# Patient Record
Sex: MALE | Race: WHITE | NOT HISPANIC OR LATINO | Employment: FULL TIME | URBAN - METROPOLITAN AREA
[De-identification: names, ages, dates, MRNs, and addresses within clinical notes are randomized per-mention and may not be internally consistent; named-entity substitution may affect disease eponyms.]

---

## 2017-08-11 ENCOUNTER — OFFICE VISIT (OUTPATIENT)
Dept: URGENT CARE | Age: 22
End: 2017-08-11
Payer: COMMERCIAL

## 2017-08-11 PROCEDURE — S9088 SERVICES PROVIDED IN URGENT: HCPCS | Performed by: FAMILY MEDICINE

## 2017-08-11 PROCEDURE — 99203 OFFICE O/P NEW LOW 30 MIN: CPT | Performed by: FAMILY MEDICINE

## 2018-03-25 ENCOUNTER — OFFICE VISIT (OUTPATIENT)
Dept: URGENT CARE | Facility: MEDICAL CENTER | Age: 23
End: 2018-03-25
Payer: COMMERCIAL

## 2018-03-25 VITALS
BODY MASS INDEX: 40.43 KG/M2 | HEART RATE: 82 BPM | WEIGHT: 315 LBS | TEMPERATURE: 98.2 F | DIASTOLIC BLOOD PRESSURE: 80 MMHG | SYSTOLIC BLOOD PRESSURE: 132 MMHG | HEIGHT: 74 IN | RESPIRATION RATE: 20 BRPM | OXYGEN SATURATION: 100 %

## 2018-03-25 DIAGNOSIS — M94.0 ACUTE COSTOCHONDRITIS: Primary | ICD-10-CM

## 2018-03-25 PROCEDURE — 99212 OFFICE O/P EST SF 10 MIN: CPT | Performed by: PHYSICIAN ASSISTANT

## 2018-03-25 PROCEDURE — S9088 SERVICES PROVIDED IN URGENT: HCPCS | Performed by: PHYSICIAN ASSISTANT

## 2018-03-25 RX ORDER — MELOXICAM 15 MG/1
15 TABLET ORAL DAILY
Qty: 14 TABLET | Refills: 0 | Status: SHIPPED | OUTPATIENT
Start: 2018-03-25 | End: 2018-09-19 | Stop reason: ALTCHOICE

## 2018-03-25 RX ORDER — RANITIDINE 300 MG/1
300 TABLET ORAL
Qty: 14 TABLET | Refills: 0 | Status: SHIPPED | OUTPATIENT
Start: 2018-03-25 | End: 2018-09-19 | Stop reason: ALTCHOICE

## 2018-03-25 RX ORDER — ESOMEPRAZOLE MAGNESIUM 40 MG/1
CAPSULE, DELAYED RELEASE ORAL 2 TIMES DAILY
COMMUNITY
End: 2019-03-05 | Stop reason: ALTCHOICE

## 2018-03-25 NOTE — PROGRESS NOTES
[facility]        NAME: Elizabeth Kelly is a 23 y o  male  : 1995    MRN: [id number]  DATE: 2018  TIME: 9:37 AM    Assessment and Plan   Acute costochondritis [M94 0]  1  Acute costochondritis  meloxicam (MOBIC) 15 mg tablet    ranitidine (ZANTAC) 300 MG tablet         Patient Instructions       Follow up with PCP in 7-14 days as Needed  Deep breaths to prevent pneumonia  Chief Complaint     Chief Complaint   Patient presents with    Shortness of Breath     Pt with complaints of chest pain , shortness of breath this am  States pain worse with inspiration and movement  Denies any trauma or illness   Chest Pain         History of Present Illness       Chest Pain    This is a new problem  The current episode started today  The onset quality is gradual  The problem occurs constantly  The problem has been unchanged  The pain is present in the substernal region  The pain is at a severity of 4/10  The quality of the pain is described as stabbing and tightness  The pain does not radiate  Pertinent negatives include no abdominal pain, back pain, claudication, cough, diaphoresis, dizziness, exertional chest pressure, fever, headaches, hemoptysis, irregular heartbeat, leg pain, lower extremity edema, malaise/fatigue, nausea, near-syncope, numbness, orthopnea, palpitations, PND, shortness of breath, sputum production, syncope, vomiting or weakness  Patient does do some lifting and this morning awoke with mid sternal chest discomfort bilaterally with the right being greater than the left  Pain is reproducible by touching the area next to his sternum  No trauma  He feels that he has some shortness of breath, but when it is described it is that he catches his breath after trying to take a deep breath due to the pain  No cough  No previous problems  No history of heart disease  He does have GERD  It is poorly controlled though he states this is different than his usual GERD symptoms    Review of Systems   Review of Systems   Constitutional: Negative for diaphoresis, fever and malaise/fatigue  Respiratory: Negative for cough, hemoptysis, sputum production and shortness of breath  Cardiovascular: Positive for chest pain  Negative for palpitations, orthopnea, claudication, syncope, PND and near-syncope  Gastrointestinal: Negative for abdominal pain, nausea and vomiting  Musculoskeletal: Negative for back pain  Neurological: Negative for dizziness, weakness, numbness and headaches  All other systems reviewed and are negative  Current Medications       Current Outpatient Prescriptions:     esomeprazole (NEXIUM) 40 MG capsule, Take by mouth, Disp: , Rfl:     meloxicam (MOBIC) 15 mg tablet, Take 1 tablet (15 mg total) by mouth daily, Disp: 14 tablet, Rfl: 0    ranitidine (ZANTAC) 300 MG tablet, Take 1 tablet (300 mg total) by mouth daily at bedtime, Disp: 14 tablet, Rfl: 0    Current Allergies     Allergies as of 03/25/2018 - Reviewed 03/25/2018   Allergen Reaction Noted    Adderall [amphetamine-dextroamphetamine] Hallucinations 03/25/2018    Other  08/11/2017            The following portions of the patient's history were reviewed and updated as appropriate: allergies, current medications, past family history, past medical history, past social history, past surgical history and problem list      Past Medical History:   Diagnosis Date    GERD (gastroesophageal reflux disease)        No past surgical history on file  No family history on file  Medications have been verified  Objective   /80 (BP Location: Right arm, Patient Position: Sitting, Cuff Size: Large)   Pulse 82   Temp 98 2 °F (36 8 °C) (Tympanic)   Resp 20   Ht 6' 2" (1 88 m)   Wt (!) 144 kg (317 lb)   SpO2 100%   BMI 40 70 kg/m²        Physical Exam     Physical Exam   Constitutional: He appears well-developed and well-nourished  Cardiovascular: Normal rate, regular rhythm and normal heart sounds  Pulmonary/Chest: Effort normal and breath sounds normal    Abdominal: Soft  Bowel sounds are normal      Obese male in no acute distress  Reproducible Tenderness to palpation between the fourth and seventh rib, right greater than left in the areas attached toThe sternum

## 2018-03-25 NOTE — PATIENT INSTRUCTIONS
Costochondritis   WHAT YOU NEED TO KNOW:   Costochondritis is a condition that causes pain in the cartilage that connect your ribs to your sternum (breastbone)  Cartilage is the tough, bendable tissue that protects your bones  DISCHARGE INSTRUCTIONS:   Medicines:   · Acetaminophen: This medicine decreases pain  Acetaminophen is available without a doctor's order  Ask how much to take and how often to take it  Follow directions  Acetaminophen can cause liver damage if not taken correctly  · NSAIDs , such as ibuprofen, help decrease swelling, pain, and fever  This medicine is available with or without a doctor's order  NSAIDs can cause stomach bleeding or kidney problems in certain people  If you take blood thinner medicine, always ask if NSAIDs are safe for you  Always read the medicine label and follow directions  Do not give these medicines to children under 10months of age without direction from your child's healthcare provider  · Take your medicine as directed  Contact your healthcare provider if you think your medicine is not helping or if you have side effects  Tell him of her if you are allergic to any medicine  Keep a list of the medicines, vitamins, and herbs you take  Include the amounts, and when and why you take them  Bring the list or the pill bottles to follow-up visits  Carry your medicine list with you in case of an emergency  Follow up with your healthcare provider as directed:  Write down your questions so you remember to ask them during your visits  Rest:  You may need to get more rest  Learn which movements and activities cause pain, and avoid doing them  Do not carry objects, such as a purse or backpack, if this is painful  Avoid activities such as rowing and weightlifting until your pain decreases or goes away  Ask which activities are best for you to do while you recover  Heat:  Heat helps decrease pain in some patients   Apply heat on the area for 20 to 30 minutes every 2 hours for as many days as directed  Ice:  Ice helps decrease swelling and pain  Ice may also help prevent tissue damage  Use an ice pack, or put crushed ice in a plastic bag  Cover it with a towel and place it on the painful area for 15 to 20 minutes every hour or as directed  Stretching exercises:  Gentle stretching may help your symptoms   a doorway and put your hands on the door frame at the level of your ears or shoulders  Take 1 step forward and gently stretch your chest  Try this with your hands higher up on the doorway  Contact your healthcare provider if:   · You have a fever  · The painful areas of your chest look swollen, red, and feel warm to the touch  · You cannot sleep because of the pain  · You have questions or concerns about your condition or care  © 2017 [employer]  Information is for End User's use only and may not be sold, redistributed or otherwise used for commercial purposes  All illustrations and images included in CareNotes® are the copyrighted property of A D A M , Inc  or [employer]  The above information is an  only  It is not intended as medical advice for individual conditions or treatments  Talk to your doctor, nurse or pharmacist before following any medical regimen to see if it is safe and effective for you

## 2018-03-25 NOTE — LETTER
March 25, 2018     Patient: Mikey Hair   YOB: 1995   Date of Visit: 3/25/2018       To Whom It May Concern: It is my medical opinion that Mikey Hair should remain out of work until 3/27/18  If you have any questions or concerns, please don't hesitate to call           Sincerely,        Mila Dickey PA-C    CC: No Recipients

## 2018-09-19 ENCOUNTER — HOSPITAL ENCOUNTER (EMERGENCY)
Facility: HOSPITAL | Age: 23
Discharge: HOME | End: 2018-09-19
Attending: EMERGENCY MEDICINE | Admitting: EMERGENCY MEDICINE
Payer: COMMERCIAL

## 2018-09-19 ENCOUNTER — APPOINTMENT (EMERGENCY)
Dept: RADIOLOGY | Facility: HOSPITAL | Age: 23
End: 2018-09-19
Payer: COMMERCIAL

## 2018-09-19 VITALS
DIASTOLIC BLOOD PRESSURE: 64 MMHG | SYSTOLIC BLOOD PRESSURE: 138 MMHG | OXYGEN SATURATION: 99 % | BODY MASS INDEX: 40.76 KG/M2 | HEART RATE: 71 BPM | RESPIRATION RATE: 16 BRPM | WEIGHT: 315 LBS | TEMPERATURE: 98.3 F

## 2018-09-19 DIAGNOSIS — R07.89 CHEST WALL PAIN: Primary | ICD-10-CM

## 2018-09-19 PROCEDURE — 99285 EMERGENCY DEPT VISIT HI MDM: CPT

## 2018-09-19 PROCEDURE — 71046 X-RAY EXAM CHEST 2 VIEWS: CPT

## 2018-09-19 PROCEDURE — 93005 ELECTROCARDIOGRAM TRACING: CPT

## 2018-09-19 RX ORDER — NAPROXEN 500 MG/1
500 TABLET ORAL EVERY 12 HOURS PRN
Qty: 15 TABLET | Refills: 0 | Status: SHIPPED | OUTPATIENT
Start: 2018-09-19 | End: 2019-01-03

## 2018-09-19 NOTE — ED PROVIDER NOTES
History  Chief Complaint   Patient presents with    Chest Pain     L sided chest pain and SOB  Started yesterday  Went away and then came back today  18-year-old male with no past medical history presents to the emergency department with episodes of sharp left-sided chest pain  He had 1 episode yesterday lasting a few minutes  Today he states he was at work and doing a rush of customers when he had sharp left-sided chest pain and he felt like his left arm was going numb  No diaphoresis or shortness of breath  This episode lasted about 10 minutes  He now feels back to his baseline  He states recently he has been trying to get back in shape and walking every day  During his walking episodes he does not experience chest pain  Chest Pain   Pain location:  L chest  Pain quality: sharp    Pain radiates to:  Does not radiate  Onset quality:  Gradual  Duration:  10 minutes  Timing:  Intermittent  Progression:  Unchanged  Chronicity:  New  Context: stress    Relieved by:  None tried  Worsened by:  Deep breathing  Ineffective treatments:  None tried  Associated symptoms: no abdominal pain, no altered mental status, no anorexia, no anxiety, no back pain, no claudication, no cough, no diaphoresis, no dizziness, no fatigue, no fever, no headache, no heartburn, no lower extremity edema, no nausea, no near-syncope, no numbness, no orthopnea, no palpitations, no PND, no shortness of breath, no syncope, not vomiting and no weakness    Risk factors: obesity    Risk factors: no coronary artery disease, no diabetes mellitus, no high cholesterol, no hypertension, no immobilization, no prior DVT/PE, no smoking and no surgery        Prior to Admission Medications   Prescriptions Last Dose Informant Patient Reported?  Taking?   esomeprazole ([employer]) 40 MG capsule 9/18/2018 at Unknown time  Yes Yes   Sig: Take by mouth      Facility-Administered Medications: None       Past Medical History:   Diagnosis Date    GERD (gastroesophageal reflux disease)        Past Surgical History:   Procedure Laterality Date    ADENOIDECTOMY      GASTROINTESTINAL SURGERY      Upper    MYRINGOTOMY W/ TUBES         Family History   Problem Relation Age of Onset    Coronary artery disease Maternal Grandfather     Diabetes type II Maternal Grandfather         Mellitus    Hypertension Maternal Grandfather     Cancer Paternal Grandmother         Unknown    Heart attack Paternal Grandfather         Myocardial Infarction    Diabetes type II Family         Mellitus    Hypertension Family      I have reviewed and agree with the history as documented  Social History   Substance Use Topics    Smoking status: Never Smoker    Smokeless tobacco: Never Used      Comment: No passive smoke exposure    Alcohol use Yes      Comment: socially        Review of Systems   Constitutional: Negative  Negative for diaphoresis, fatigue and fever  HENT: Negative  Eyes: Negative  Respiratory: Negative  Negative for cough and shortness of breath  Cardiovascular: Positive for chest pain  Negative for palpitations, orthopnea, claudication, leg swelling, syncope, PND and near-syncope  Gastrointestinal: Negative  Negative for abdominal pain, anorexia, heartburn, nausea and vomiting  Genitourinary: Negative  Musculoskeletal: Negative for back pain and neck pain  Skin: Negative  Allergic/Immunologic: Negative  Neurological: Negative  Negative for dizziness, weakness, numbness and headaches  Hematological: Negative  Psychiatric/Behavioral: Negative  All other systems reviewed and are negative  Physical Exam  Physical Exam   Constitutional: He is oriented to person, place, and time  He appears well-developed and well-nourished  Non-toxic appearance  He does not have a sickly appearance  He does not appear ill  No distress  HENT:   Head: Normocephalic and atraumatic     Right Ear: External ear normal    Left Ear: External ear normal    Mouth/Throat: Oropharynx is clear and moist    Eyes: Conjunctivae are normal  Pupils are equal, round, and reactive to light  No scleral icterus  Cardiovascular: Normal rate, regular rhythm and normal heart sounds  Pulmonary/Chest: Effort normal and breath sounds normal    Abdominal: Soft  Bowel sounds are normal  He exhibits no distension and no mass  There is no tenderness  No hernia  obese   Musculoskeletal: Normal range of motion  He exhibits no edema, tenderness or deformity  Neurological: He is alert and oriented to person, place, and time  He has normal strength and normal reflexes  He exhibits normal muscle tone  Skin: Skin is warm and dry  No rash noted  He is not diaphoretic  No erythema  No pallor  Psychiatric: He has a normal mood and affect  Nursing note and vitals reviewed  Vital Signs  ED Triage Vitals [09/19/18 1839]   Temperature Pulse Respirations Blood Pressure SpO2   98 3 °F (36 8 °C) 70 16 155/70 100 %      Temp Source Heart Rate Source Patient Position - Orthostatic VS BP Location FiO2 (%)   Oral Monitor Sitting Left arm --      Pain Score       1           Vitals:    09/19/18 1839 09/19/18 1845   BP: 155/70 155/70   Pulse: 70 76   Patient Position - Orthostatic VS: Sitting Sitting       Visual Acuity      ED Medications  Medications - No data to display    Diagnostic Studies  Results Reviewed     None                 XR chest 2 views   ED Interpretation by Caty Key DO (09/19 1938)   nad                 Procedures  Procedures       Phone Contacts  ED Phone Contact    ED Course                               MDM  Number of Diagnoses or Management Options  Diagnosis management comments: 17-year-old male presents with episodes of sharp left chest pain since yesterday  These episodes are not associated with exertion  It last about 10 minutes  On exam he appears well and is currently asymptomatic  His exam here is normal along with his EKG    Only risk factor is that of obesity, however his history is not concerning for acute coronary syndrome  This may be related to anxiety versus chest wall pain  Will do chest x-ray to rule out underlying pneumonia/pneumothorax  Amount and/or Complexity of Data Reviewed  Tests in the radiology section of CPT®: ordered and reviewed      CritCare Time    Disposition  Final diagnoses:   Chest wall pain     Time reflects when diagnosis was documented in both MDM as applicable and the Disposition within this note     Time User Action Codes Description Comment    9/19/2018  7:39 PM Abdulaziz Murdockheladio NANCY Add [R07 89] Chest wall pain       ED Disposition     ED Disposition Condition Comment    Discharge  Heliojanet Izquierdo discharge to home/self care  Condition at discharge: Good        Follow-up Information     Follow up With Specialties Details Why Contact Info Additional Information    [facility] [address] Lab Schedule an appointment as soon as possible for a visit in 2 days If symptoms worsen [place] [id number] [facility] [address] [facility], [address] [place] [address] , [place], [address] [place] [address], [phone]          Patient's Medications   Discharge Prescriptions    NAPROXEN (NAPROSYN) 500 MG TABLET    Take 1 tablet (500 mg total) by mouth every 12 (twelve) hours as needed for mild pain or moderate pain       Start Date: 9/19/2018 End Date: --       Order Dose: 500 mg       Quantity: 15 tablet    Refills: 0     No discharge procedures on file      ED Provider  Electronically Signed by           Jim Blackman DO  09/19/18 6235

## 2018-09-19 NOTE — DISCHARGE INSTRUCTIONS
Chest Wall Pain   WHAT YOU NEED TO KNOW:   Chest wall pain may be caused by problems with the muscles, cartilage, or bones of the chest wall  Chest wall pain may also be caused by pain that spreads to your chest from another part of your body  The pain may be aching, severe, dull, or sharp  It may come and go, or it may be constant  The pain may be worse when you move in certain ways, breathe deeply, or cough  DISCHARGE INSTRUCTIONS:   Call 911 if:   · You have any of the following signs of a heart attack:      ¨ Squeezing, pressure, or pain in your chest that lasts longer than 5 minutes or returns    ¨ Discomfort or pain in your back, neck, jaw, stomach, or arm     ¨ Trouble breathing    ¨ Nausea or vomiting    ¨ Lightheadedness or a sudden cold sweat, especially with chest pain or trouble breathing    Return to the emergency department if:   · You have severe pain  Contact your healthcare provider if:   · You develop a rash  · You have other new symptoms  · Your pain does not improve, even with treatment  · You have questions or concerns about your condition or care  Medicines: You may need any of the following:  · NSAIDs , such as ibuprofen, help decrease swelling, pain, and fever  This medicine is available with or without a doctor's order  NSAIDs can cause stomach bleeding or kidney problems in certain people  If you take blood thinner medicine, always ask your healthcare provider if NSAIDs are safe for you  Always read the medicine label and follow directions  · Acetaminophen  decreases pain  It is available without a doctor's order  Ask how much to take and how often to take it  Follow directions  Acetaminophen can cause liver damage if not taken correctly  · A cream  may be applied to your chest to decrease pain  · Take your medicine as directed  Contact your healthcare provider if you think your medicine is not helping or if you have side effects   Tell him of her if you are allergic to any medicine  Keep a list of the medicines, vitamins, and herbs you take  Include the amounts, and when and why you take them  Bring the list or the pill bottles to follow-up visits  Carry your medicine list with you in case of an emergency  Follow up with your healthcare provider as directed:  Write down your questions so you remember to ask them during your visits  Self-care:   · Rest  as needed  Avoid activities that make your chest wall pain worse  · Apply heat  on your chest for 20 to 30 minutes every 2 hours for as many days as directed  Heat helps decrease pain and muscle spasms  · Apply ice  on your chest for 15 to 20 minutes every hour or as directed  Use an ice pack, or put crushed ice in a plastic bag  Cover it with a towel  Ice helps prevent tissue damage and decreases swelling and pain  © 2017 [employer]  Information is for End User's use only and may not be sold, redistributed or otherwise used for commercial purposes  All illustrations and images included in CareNotes® are the copyrighted property of [employer] , Inc  or [employer]  The above information is an  only  It is not intended as medical advice for individual conditions or treatments  Talk to your doctor, nurse or pharmacist before following any medical regimen to see if it is safe and effective for you

## 2018-09-19 NOTE — ED NOTES
Pt reports CP that started yesterday in upper left chest   Pt states pain resolved but returned today  Pt states he thinks he may have "pulled a muscle or something "  Pt denies SOB at present         Kaity Triana RN  09/19/18 1681

## 2018-09-20 LAB
ATRIAL RATE: 70 BPM
ATRIAL RATE: 74 BPM
P AXIS: 10 DEGREES
P AXIS: 40 DEGREES
PR INTERVAL: 116 MS
PR INTERVAL: 128 MS
QRS AXIS: 48 DEGREES
QRS AXIS: 49 DEGREES
QRSD INTERVAL: 102 MS
QRSD INTERVAL: 98 MS
QT INTERVAL: 374 MS
QT INTERVAL: 374 MS
QTC INTERVAL: 403 MS
QTC INTERVAL: 415 MS
T WAVE AXIS: 51 DEGREES
T WAVE AXIS: 58 DEGREES
VENTRICULAR RATE: 70 BPM
VENTRICULAR RATE: 74 BPM

## 2018-09-20 PROCEDURE — 93010 ELECTROCARDIOGRAM REPORT: CPT | Performed by: INTERNAL MEDICINE

## 2019-01-03 ENCOUNTER — OFFICE VISIT (OUTPATIENT)
Dept: GASTROENTEROLOGY | Facility: MEDICAL CENTER | Age: 24
End: 2019-01-03
Payer: COMMERCIAL

## 2019-01-03 VITALS
WEIGHT: 315 LBS | HEART RATE: 84 BPM | DIASTOLIC BLOOD PRESSURE: 82 MMHG | HEIGHT: 74 IN | BODY MASS INDEX: 40.43 KG/M2 | TEMPERATURE: 97.1 F | SYSTOLIC BLOOD PRESSURE: 136 MMHG

## 2019-01-03 DIAGNOSIS — R10.13 DYSPEPSIA: Primary | ICD-10-CM

## 2019-01-03 PROCEDURE — 99244 OFF/OP CNSLTJ NEW/EST MOD 40: CPT | Performed by: INTERNAL MEDICINE

## 2019-01-03 RX ORDER — OMEPRAZOLE 40 MG/1
40 CAPSULE, DELAYED RELEASE ORAL DAILY
Qty: 30 CAPSULE | Refills: 1 | Status: SHIPPED | OUTPATIENT
Start: 2019-01-03 | End: 2019-02-26 | Stop reason: SDUPTHER

## 2019-01-03 NOTE — PROGRESS NOTES
[facility] - Outpatient Consultation  Valarie Powers  20 y o  male MRN: [id number]  Encounter: [id number]          ASSESSMENT AND PLAN:      1  Dyspepsia  - Trial of omeprazole to see whether it can relieve his symptoms    - Stool H  Pylori antigen test   - Plan for EGD to rule out PUD  - Abdominal U/S to rule out gallstone disease        ______________________________________________________________________    HPI:  42-year-old man with morbid obesity referred for evaluation of dyspepsia  Patient reported he feels uncomfortable after eating in epigastric area, especially after eating greasy food  Patient has been having symptoms for several years  He was initially evaluated by [facility] and he was told gastritis and a hole on the liver   Patient reported he has been gaining weight in the past few years  He initially was taking Dexilant with relief of his symptoms but his insurance does not cover it, when he switched to Nexium he reported his symptoms recurred  REVIEW OF SYSTEMS:    CONSTITUTIONAL: Denies any fever, chills, rigors, and weight loss  HEENT: No earache or tinnitus  Denies hearing loss or visual disturbances  CARDIOVASCULAR: No chest pain or palpitations  RESPIRATORY: Denies any cough, hemoptysis, shortness of breath or dyspnea on exertion  GASTROINTESTINAL: As noted in the History of Present Illness  GENITOURINARY: No problems with urination  Denies any hematuria or dysuria  NEUROLOGIC: No dizziness or vertigo, denies headaches  MUSCULOSKELETAL: Denies any muscle or joint pain  SKIN: Denies skin rashes or itching  ENDOCRINE: Denies excessive thirst  Denies intolerance to heat or cold  PSYCHOSOCIAL: Denies depression or anxiety  Denies any recent memory loss         Historical Information   Past Medical History:   Diagnosis Date    GERD (gastroesophageal reflux disease)      Past Surgical History:   Procedure Laterality Date    ADENOIDECTOMY  GASTROINTESTINAL SURGERY      Upper    MYRINGOTOMY W/ TUBES       Social History   History   Alcohol Use    Yes     Comment: socially     History   Drug Use No     History   Smoking Status    Never Smoker   Smokeless Tobacco    Never Used     Comment: No passive smoke exposure     Family History   Problem Relation Age of Onset    Coronary artery disease Maternal Grandfather     Diabetes type II Maternal Grandfather         Mellitus    Hypertension Maternal Grandfather     Cancer Paternal Grandmother         Unknown    Heart attack Paternal Grandfather         Myocardial Infarction    Diabetes type II Family         Mellitus    Hypertension Family        Meds/Allergies       Current Outpatient Prescriptions:     esomeprazole (NEXIUM) 40 MG capsule    omeprazole (PriLOSEC) 40 MG capsule    Allergies   Allergen Reactions    Adderall [Amphetamine-Dextroamphetamine] Hallucinations    Other            Objective     Blood pressure 136/82, pulse 84, temperature (!) 97 1 °F (36 2 °C), temperature source Tympanic Core, height 6' 2" (1 88 m), weight (!) 150 kg (331 lb)  Body mass index is 42 5 kg/m²  PHYSICAL EXAM:      General Appearance:   Alert, cooperative, no distress   HEENT:   Normocephalic, atraumatic, anicteric      Neck:  Supple, symmetrical, trachea midline   Lungs:   Clear to auscultation bilaterally; no rales, rhonchi or wheezing; respirations unlabored    Heart[de-identified]   Regular rate and rhythm; no murmur, rub, or gallop  Abdomen:   Soft, non-tender, non-distended; normal bowel sounds; no masses, no organomegaly    Genitalia:   Deferred    Rectal:   Deferred    Extremities:  No cyanosis, clubbing or edema    Pulses:  2+ and symmetric    Skin:  No jaundice, rashes, or lesions    Lymph nodes:  No palpable cervical lymphadenopathy        Lab Results:   No visits with results within 1 Day(s) from this visit     Latest known visit with results is:   Admission on 09/19/2018, Discharged on 09/19/2018   Component Date Value    Ventricular Rate 09/19/2018 74     Atrial Rate 09/19/2018 74     AZ Interval 09/19/2018 116     QRSD Interval 09/19/2018 102     QT Interval 09/19/2018 374     QTC Interval 09/19/2018 415     P Axis 09/19/2018 40     QRS Axis 09/19/2018 49     T Wave Axis 09/19/2018 58     Ventricular Rate 09/19/2018 70     Atrial Rate 09/19/2018 70     AZ Interval 09/19/2018 128     QRSD Interval 09/19/2018 98     QT Interval 09/19/2018 374     QTC Interval 09/19/2018 403     P Axis 09/19/2018 10     QRS Axis 09/19/2018 48     T Wave Axis 09/19/2018 51          Radiology Results:   No results found

## 2019-01-03 NOTE — LETTER
January 3, 2019     Referral [address] [place], [place] [id number]    Patient: Elizabeth Schneider  YOB: 1995   Date of Visit: 1/3/2019       Dear Dr Junior Nettles:    Thank you for referring Josie Quigley to me for evaluation  Below are my notes for this consultation  If you have questions, please do not hesitate to call me  I look forward to following your patient along with you  Sincerely,        Adilia Whitney MD        CC: No Recipients  Adilia Whitney MD  1/3/2019 12:50 PM  Sign at close encounter  [facility] - Outpatient Consultation  Elizabeth Schneider  20 y o  male MRN: [id number]  Encounter: [id number]          ASSESSMENT AND PLAN:      1  Dyspepsia  - Trial of omeprazole to see whether it can relieve his symptoms    - Stool H  Pylori antigen test   - Plan for EGD to rule out PUD  - Abdominal U/S to rule out gallstone disease        ______________________________________________________________________    HPI:  70-year-old man with morbid obesity referred for evaluation of dyspepsia  Patient reported he feels uncomfortable after eating in epigastric area, especially after eating greasy food  Patient has been having symptoms for several years  He was initially evaluated by [facility] and he was told gastritis and a hole on the liver   Patient reported he has been gaining weight in the past few years  He initially was taking Dexilant with relief of his symptoms but his insurance does not cover it, when he switched to Nexium he reported his symptoms recurred  REVIEW OF SYSTEMS:    CONSTITUTIONAL: Denies any fever, chills, rigors, and weight loss  HEENT: No earache or tinnitus  Denies hearing loss or visual disturbances  CARDIOVASCULAR: No chest pain or palpitations  RESPIRATORY: Denies any cough, hemoptysis, shortness of breath or dyspnea on exertion  GASTROINTESTINAL: As noted in the History of Present Illness     GENITOURINARY: No problems with urination  Denies any hematuria or dysuria  NEUROLOGIC: No dizziness or vertigo, denies headaches  MUSCULOSKELETAL: Denies any muscle or joint pain  SKIN: Denies skin rashes or itching  ENDOCRINE: Denies excessive thirst  Denies intolerance to heat or cold  PSYCHOSOCIAL: Denies depression or anxiety  Denies any recent memory loss  Historical Information   Past Medical History:   Diagnosis Date    GERD (gastroesophageal reflux disease)      Past Surgical History:   Procedure Laterality Date    ADENOIDECTOMY      GASTROINTESTINAL SURGERY      Upper    MYRINGOTOMY W/ TUBES       Social History   History   Alcohol Use    Yes     Comment: socially     History   Drug Use No     History   Smoking Status    Never Smoker   Smokeless Tobacco    Never Used     Comment: No passive smoke exposure     Family History   Problem Relation Age of Onset    Coronary artery disease Maternal Grandfather     Diabetes type II Maternal Grandfather         Mellitus    Hypertension Maternal Grandfather     Cancer Paternal Grandmother         Unknown    Heart attack Paternal Grandfather         Myocardial Infarction    Diabetes type II Family         Mellitus    Hypertension Family        Meds/Allergies       Current Outpatient Prescriptions:     esomeprazole (NEXIUM) 40 MG capsule    omeprazole (PriLOSEC) 40 MG capsule    Allergies   Allergen Reactions    Adderall [Amphetamine-Dextroamphetamine] Hallucinations    Other            Objective     Blood pressure 136/82, pulse 84, temperature (!) 97 1 °F (36 2 °C), temperature source Tympanic Core, height 6' 2" (1 88 m), weight (!) 150 kg (331 lb)  Body mass index is 42 5 kg/m²          PHYSICAL EXAM:      General Appearance:   Alert, cooperative, no distress   HEENT:   Normocephalic, atraumatic, anicteric      Neck:  Supple, symmetrical, trachea midline   Lungs:   Clear to auscultation bilaterally; no rales, rhonchi or wheezing; respirations unlabored    Heart[de-identified]   Regular rate and rhythm; no murmur, rub, or gallop  Abdomen:   Soft, non-tender, non-distended; normal bowel sounds; no masses, no organomegaly    Genitalia:   Deferred    Rectal:   Deferred    Extremities:  No cyanosis, clubbing or edema    Pulses:  2+ and symmetric    Skin:  No jaundice, rashes, or lesions    Lymph nodes:  No palpable cervical lymphadenopathy        Lab Results:   No visits with results within 1 Day(s) from this visit  Latest known visit with results is:   Admission on 09/19/2018, Discharged on 09/19/2018   Component Date Value    Ventricular Rate 09/19/2018 74     Atrial Rate 09/19/2018 74     SC Interval 09/19/2018 116     QRSD Interval 09/19/2018 102     QT Interval 09/19/2018 374     QTC Interval 09/19/2018 415     P Axis 09/19/2018 40     QRS Axis 09/19/2018 49     T Wave Axis 09/19/2018 58     Ventricular Rate 09/19/2018 70     Atrial Rate 09/19/2018 70     SC Interval 09/19/2018 128     QRSD Interval 09/19/2018 98     QT Interval 09/19/2018 374     QTC Interval 09/19/2018 403     P Axis 09/19/2018 10     QRS Axis 09/19/2018 48     T Wave Axis 09/19/2018 51          Radiology Results:   No results found

## 2019-01-03 NOTE — PATIENT INSTRUCTIONS
The patient is scheduled on 3/2/19 at [facility] for an EGD with Dr Nieves Reveles  Prep was gone over with by the MA  He is scheduled for his US of abd on 1/9/19 at [facility]   Pt is aware of all instructions

## 2019-01-09 ENCOUNTER — HOSPITAL ENCOUNTER (OUTPATIENT)
Dept: ULTRASOUND IMAGING | Facility: MEDICAL CENTER | Age: 24
Discharge: HOME | End: 2019-01-09
Attending: INTERNAL MEDICINE
Payer: COMMERCIAL

## 2019-01-09 DIAGNOSIS — R10.13 DYSPEPSIA: ICD-10-CM

## 2019-01-09 PROCEDURE — 76705 ECHO EXAM OF ABDOMEN: CPT

## 2019-02-26 ENCOUNTER — TELEPHONE (OUTPATIENT)
Dept: GASTROENTEROLOGY | Facility: CLINIC | Age: 24
End: 2019-02-26

## 2019-02-26 DIAGNOSIS — R10.13 DYSPEPSIA: ICD-10-CM

## 2019-02-26 RX ORDER — OMEPRAZOLE 40 MG/1
40 CAPSULE, DELAYED RELEASE ORAL DAILY
Qty: 30 CAPSULE | Refills: 4 | Status: SHIPPED | OUTPATIENT
Start: 2019-02-26 | End: 2019-02-28 | Stop reason: SDUPTHER

## 2019-02-26 NOTE — TELEPHONE ENCOUNTER
Malaika pt     Please send a refill for omeprazole 40mg to [facility] on [facility] [phone]

## 2019-02-28 DIAGNOSIS — R10.13 DYSPEPSIA: ICD-10-CM

## 2019-02-28 RX ORDER — OMEPRAZOLE 40 MG/1
40 CAPSULE, DELAYED RELEASE ORAL DAILY
Qty: 30 CAPSULE | Refills: 4 | Status: SHIPPED | OUTPATIENT
Start: 2019-02-28 | End: 2019-03-05 | Stop reason: SDUPTHER

## 2019-03-05 ENCOUNTER — TELEPHONE (OUTPATIENT)
Dept: GASTROENTEROLOGY | Facility: MEDICAL CENTER | Age: 24
End: 2019-03-05

## 2019-03-05 DIAGNOSIS — R10.13 DYSPEPSIA: ICD-10-CM

## 2019-03-05 RX ORDER — OMEPRAZOLE 40 MG/1
40 CAPSULE, DELAYED RELEASE ORAL DAILY
Qty: 30 CAPSULE | Refills: 5 | Status: SHIPPED | OUTPATIENT
Start: 2019-03-05 | End: 2019-09-24 | Stop reason: SDUPTHER

## 2019-03-05 NOTE — TELEPHONE ENCOUNTER
Dr Sarita Mera patient called for a refill of omeprazole 40mg  Patient request if this can be sent to [facility]   Patient can be reached at [phone]

## 2019-03-20 ENCOUNTER — ANESTHESIA EVENT (OUTPATIENT)
Dept: GASTROENTEROLOGY | Facility: HOSPITAL | Age: 24
End: 2019-03-20
Payer: COMMERCIAL

## 2019-03-21 ENCOUNTER — ANESTHESIA (OUTPATIENT)
Dept: GASTROENTEROLOGY | Facility: HOSPITAL | Age: 24
End: 2019-03-21
Payer: COMMERCIAL

## 2019-03-21 ENCOUNTER — HOSPITAL ENCOUNTER (OUTPATIENT)
Facility: HOSPITAL | Age: 24
Setting detail: OUTPATIENT SURGERY
Discharge: HOME | End: 2019-03-21
Attending: INTERNAL MEDICINE | Admitting: INTERNAL MEDICINE
Payer: COMMERCIAL

## 2019-03-21 VITALS
OXYGEN SATURATION: 96 % | HEART RATE: 83 BPM | DIASTOLIC BLOOD PRESSURE: 65 MMHG | SYSTOLIC BLOOD PRESSURE: 126 MMHG | HEIGHT: 74 IN | BODY MASS INDEX: 40.43 KG/M2 | WEIGHT: 315 LBS | RESPIRATION RATE: 16 BRPM | TEMPERATURE: 98.6 F

## 2019-03-21 DIAGNOSIS — R10.13 DYSPEPSIA: ICD-10-CM

## 2019-03-21 PROCEDURE — 43239 EGD BIOPSY SINGLE/MULTIPLE: CPT | Performed by: INTERNAL MEDICINE

## 2019-03-21 PROCEDURE — 88305 TISSUE EXAM BY PATHOLOGIST: CPT | Performed by: PATHOLOGY

## 2019-03-21 RX ORDER — SODIUM CHLORIDE 9 MG/ML
125 INJECTION, SOLUTION INTRAVENOUS CONTINUOUS
Status: DISCONTINUED | OUTPATIENT
Start: 2019-03-21 | End: 2019-03-21 | Stop reason: HOSPADM

## 2019-03-21 RX ORDER — PROPOFOL 10 MG/ML
INJECTION, EMULSION INTRAVENOUS AS NEEDED
Status: DISCONTINUED | OUTPATIENT
Start: 2019-03-21 | End: 2019-03-21 | Stop reason: SURG

## 2019-03-21 RX ADMIN — LIDOCAINE HYDROCHLORIDE 10 ML: 20 SOLUTION ORAL; TOPICAL at 11:33

## 2019-03-21 RX ADMIN — PROPOFOL 50 MG: 10 INJECTION, EMULSION INTRAVENOUS at 11:45

## 2019-03-21 RX ADMIN — PROPOFOL 50 MG: 10 INJECTION, EMULSION INTRAVENOUS at 11:43

## 2019-03-21 RX ADMIN — PROPOFOL 50 MG: 10 INJECTION, EMULSION INTRAVENOUS at 11:46

## 2019-03-21 RX ADMIN — LIDOCAINE HYDROCHLORIDE 100 MG: 20 INJECTION, SOLUTION INTRAVENOUS at 11:42

## 2019-03-21 RX ADMIN — PROPOFOL 25 MG: 10 INJECTION, EMULSION INTRAVENOUS at 11:49

## 2019-03-21 RX ADMIN — SODIUM CHLORIDE 125 ML/HR: 0.9 INJECTION, SOLUTION INTRAVENOUS at 10:25

## 2019-03-21 RX ADMIN — PROPOFOL 150 MG: 10 INJECTION, EMULSION INTRAVENOUS at 11:42

## 2019-03-21 NOTE — H&P
History and Physical -  [facility]  Brant Moya  20 y o  male MRN: [id number]                  HPI: Brant Moya  is a 20y o  year old male who presents for dyspepsia      REVIEW OF SYSTEMS: Per the HPI, and otherwise unremarkable  Historical Information   Past Medical History:   Diagnosis Date    GERD (gastroesophageal reflux disease)     Morbid obesity ([address] )      Past Surgical History:   Procedure Laterality Date    ADENOIDECTOMY      MYRINGOTOMY W/ TUBES       Social History   Social History     Substance and Sexual Activity   Alcohol Use Yes    Comment: socially     Social History     Substance and Sexual Activity   Drug Use No     Social History     Tobacco Use   Smoking Status Never Smoker   Smokeless Tobacco Never Used   Tobacco Comment    No passive smoke exposure     Family History   Problem Relation Age of Onset    Coronary artery disease Maternal Grandfather     Diabetes type II Maternal Grandfather         Mellitus    Hypertension Maternal Grandfather     Cancer Paternal Grandmother         Unknown    Heart attack Paternal Grandfather         Myocardial Infarction    Diabetes type II Family         Mellitus    Hypertension Family        Meds/Allergies     Medications Prior to Admission   Medication    omeprazole (PriLOSEC) 40 MG capsule       Allergies   Allergen Reactions    Adderall [Amphetamine-Dextroamphetamine] Hallucinations    Other        Objective     Blood pressure 135/65, pulse 95, temperature 98 6 °F (37 °C), temperature source Temporal, resp  rate 18, height 6' 2" (1 88 m), weight (!) 145 kg (320 lb), SpO2 97 %  PHYSICAL EXAM    Gen: NAD  CV: RRR  CHEST: Clear  ABD: soft, NT/ND  EXT: no edema      ASSESSMENT/PLAN:  This is a 20y o  year old male here for dyspepsia, and he is stable and optimized for his procedure

## 2019-03-21 NOTE — OP NOTE
ESOPHAGOGASTRODUODENOSCOPY    PROCEDURE: EGD    SEDATION: Monitored anesthesia care, check anesthesia records    ASA Class: 3    INDICATIONS: dyspepsia    CONSENT:  Informed consent was obtained for the procedure, including sedation after explaining the risks and benefits of the procedure  Risks including but not limited to bleeding, perforation, infection, and missed lesion  PREPARATION:   Telemetry, pulse oximetry, blood pressure were monitored throughout the procedure  Patient was identified by myself both verbally and by visual inspection of ID band  DESCRIPTION:   Patient was placed in the left lateral decubitus position and was sedated with the above medication  The gastroscope was introduced in to the oropharynx and the esophagus was intubated under direct visualization  Scope was passed down the esophagus up to 2nd part of the duodenum  A careful inspection was made as the gastroscope was withdrawn, including a retroflexed view of the stomach; findings and interventions are described below  FINDINGS:    #1  Esophagus- Z-line at 45 cm, regular  normal esophageal mucosa  #2  Stomach- mild gastritis in the antrum  This was biopsied using cold forceps biopsies  #3  Duodenum- normal mucosa, cold forceps biopsies taken  IMPRESSIONS:      Mild gastritis    RECOMMENDATIONS:     Follow up biopsy results  Continue PPI  Patient reported his dyspepsia has largely improved with omeprazole  He Can follow up in the office if the symptom worsens  COMPLICATIONS:  None; patient tolerated the procedure well      SPECIMENS:    ID Type Source Tests Collected by Time Destination   1 : duodenal bx r/o ciliac Tissue Small Bowel, NOS TISSUE EXAM Amy Barry MD 3/21/2019 11:44 AM    2 : gastric bx r/o h  pilori Tissue Stomach TISSUE EXAM Amy Barry MD 3/21/2019 11:46 AM        ESTIMATED BLOOD LOSS:  Minimal

## 2019-03-21 NOTE — DISCHARGE INSTRUCTIONS
Gastritis   WHAT YOU NEED TO KNOW:   What is gastritis? Gastritis is inflammation or irritation of the lining of your stomach  What increases my risk for gastritis? · Infection with bacteria, a virus, or a parasite    · NSAIDs, aspirin, or steroid medicine    · Use of tobacco products or alcohol    · Trauma such as an injury to your stomach or intestine    · Autoimmune disorders such as diabetes, thyroid disease, or Crohn disease    · Stress    · Age older than 60 years    · Illegal drugs, such as cocaine  What are the signs and symptoms of gastritis? · Stomach pain, burning, or tenderness when you press on it    · Stomach fullness or tightness    · Nausea or vomiting    · Loss of appetite, or feeling full quickly when you eat    · Bad breath    · Fatigue or feeling more tired than usual    · Heartburn  How is gastritis diagnosed? Your healthcare provider will ask about your signs and symptoms and examine you  You may need any of the following:  · Blood tests  may be used to show an infection, dehydration, or anemia (low red blood cell levels)  · A bowel movement sample  may be tested for blood or the germ that may be causing your gastritis  · A breath test  may show if H pylori is causing your gastritis  You will be given a liquid to drink  Then you will breathe into a bag  Your healthcare provider will measure the amount of carbon dioxide in your breath  Extra amounts of carbon dioxide may mean you have an H pylori infection  · An endoscopy  may be used to look for irritation or bleeding in your stomach  Your healthcare provider will use an endoscope (tube with a light and camera on the end) during the procedure  He or she may take a sample from your stomach to be tested  How is gastritis treated? Your symptoms may go away without treatment  Treatment will depend on what is causing your gastritis  Your healthcare provider may recommend changes to the medicines you take   Medicines may be given to help treat a bacterial infection or decrease stomach acid  How can I manage or prevent gastritis? · Do not smoke  Nicotine and other chemicals in cigarettes and cigars can make your symptoms worse and cause lung damage  Ask your healthcare provider for information if you currently smoke and need help to quit  E-cigarettes or smokeless tobacco still contain nicotine  Talk to your healthcare provider before you use these products  · Do not drink alcohol  Alcohol can prevent healing and make your gastritis worse  Talk to your healthcare provider if you need help to stop drinking  · Do not take NSAIDs or aspirin unless directed  These and similar medicines can cause irritation of your stomach lining  If your healthcare provider says it is okay to take NSAIDs, take them with food  · Do not eat foods that cause irritation  Foods such as oranges and salsa can cause burning or pain  Eat a variety of healthy foods  Examples include fruits (not citrus), vegetables, low-fat dairy products, beans, whole-grain breads, and lean meats and fish  Try to eat small meals, and drink water with your meals  Do not eat for at least 3 hours before you go to bed  · Find ways to relax and decrease stress  Stress can increase stomach acid and make gastritis worse  Activities such as yoga, meditation, or listening to music can help you relax  Spend time with friends, or do things you enjoy  Call 911 for any of the following:   · You develop chest pain or shortness of breath  When should I seek immediate care? · You vomit blood  · You have black or bloody bowel movements  · You have severe stomach or back pain  When should I contact my healthcare provider? · You have a fever  · You have new or worsening symptoms, even after treatment  · You have questions or concerns about your condition or care  CARE AGREEMENT:   You have the right to help plan your care   Learn about your health condition and how it may be treated  Discuss treatment options with your caregivers to decide what care you want to receive  You always have the right to refuse treatment  The above information is an  only  It is not intended as medical advice for individual conditions or treatments  Talk to your doctor, nurse or pharmacist before following any medical regimen to see if it is safe and effective for you  © 2017 [employer] Information is for End User's use only and may not be sold, redistributed or otherwise used for commercial purposes  All illustrations and images included in CareNotes® are the copyrighted property of [employer]  or [employer]  Upper Endoscopy   WHAT YOU NEED TO KNOW:   An upper endoscopy is also called an upper gastrointestinal (GI) endoscopy, or an esophagogastroduodenoscopy (EGD)  You may feel bloated, gassy, or have some abdominal discomfort after your procedure  Your throat may be sore for 24 to 36 hours  You may burp or pass gas from air that is still inside your body  DISCHARGE INSTRUCTIONS:   Call 911 if:   · You have sudden chest pain or trouble breathing  Seek care immediately if:   · You feel dizzy or faint  · You have trouble swallowing  · You have severe throat pain  · Your bowel movements are very dark or black  · Your abdomen is hard and firm and you have severe pain  · You vomit blood  Contact your healthcare provider if:   · You feel full or bloated and cannot burp or pass gas  · You have not had a bowel movement for 3 days after your procedure  · You have neck pain  · You have a fever or chills  · You have nausea or are vomiting  · You have a rash or hives  · You have questions or concerns about your endoscopy  Relieve a sore throat:  Suck on throat lozenges or crushed ice  Gargle with a small amount of warm salt water  Mix 1 teaspoon of salt and 1 cup of warm water to make salt water  Relieve gas and discomfort from bloating:  Lie on your right side with a heating pad on your abdomen  Take short walks to help pass gas  Eat small meals until bloating is relieved  Rest after your procedure:  Do not drive or make important decisions until the day after your procedure  Return to your normal activity as directed  You can usually return to work the day after your procedure  Follow up with your healthcare provider as directed:  Write down your questions so you remember to ask them during your visits  © 2017 [address] [facility] Information is for End User's use only and may not be sold, redistributed or otherwise used for commercial purposes  All illustrations and images included in CareNotes® are the copyrighted property of [employer] M [employer] Inc  or [employer] Bhatt  The above information is an  only  It is not intended as medical advice for individual conditions or treatments  Talk to your doctor, nurse or pharmacist before following any medical regimen to see if it is safe and effective for you

## 2019-03-21 NOTE — ANESTHESIA POSTPROCEDURE EVALUATION
Post-Op Assessment Note    CV Status:  Stable    Pain management: adequate     Mental Status:  Alert and awake   Hydration Status:  Euvolemic   PONV Controlled:  Controlled   Airway Patency:  Patent   Post Op Vitals Reviewed: Yes      Staff: Anesthesiologist, CRNA           BP      Temp      Pulse     Resp      SpO2

## 2019-03-21 NOTE — DISCHARGE INSTR - AVS FIRST PAGE
ESOPHAGOGASTRODUODENOSCOPY    PROCEDURE: EGD    SEDATION: Monitored anesthesia care, check anesthesia records    ASA Class: 3    INDICATIONS: dyspepsia    CONSENT:  Informed consent was obtained for the procedure, including sedation after explaining the risks and benefits of the procedure  Risks including but not limited to bleeding, perforation, infection, and missed lesion  PREPARATION:   Telemetry, pulse oximetry, blood pressure were monitored throughout the procedure  Patient was identified by myself both verbally and by visual inspection of ID band  DESCRIPTION:   Patient was placed in the left lateral decubitus position and was sedated with the above medication  The gastroscope was introduced in to the oropharynx and the esophagus was intubated under direct visualization  Scope was passed down the esophagus up to 2nd part of the duodenum  A careful inspection was made as the gastroscope was withdrawn, including a retroflexed view of the stomach; findings and interventions are described below  FINDINGS:    #1  Esophagus- Z-line at 45 cm, regular  normal esophageal mucosa  #2  Stomach- mild gastritis in the antrum  This was biopsied using cold forceps biopsies  #3  Duodenum- normal mucosa, cold forceps biopsies taken  IMPRESSIONS:      Mild gastritis    RECOMMENDATIONS:     Follow up biopsy results  Continue PPI  Patient reported his dyspepsia has largely improved with omeprazole  He Can follow up in the office if the symptom worsens  COMPLICATIONS:  None; patient tolerated the procedure well      SPECIMENS:    ID Type Source Tests Collected by Time Destination   1 : duodenal bx r/o ciliac Tissue Small Bowel, NOS TISSUE EXAM Holly Curiel MD 3/21/2019 11:44 AM    2 : gastric bx r/o h  pilori Tissue Stomach TISSUE EXAM Holly Curiel MD 3/21/2019 11:46 AM        ESTIMATED BLOOD LOSS:  Minimal

## 2019-03-21 NOTE — ANESTHESIA PREPROCEDURE EVALUATION
Review of Systems/Medical History          Cardiovascular  Negative cardio ROS    Pulmonary  Sleep apnea: never tested for LUCIE but snores loudly  ,        GI/Hepatic    GERD ,   Comment: Fatty liver          Endo/Other    Obesity  morbid obesity   GYN       Hematology  Negative hematology ROS      Musculoskeletal  Negative musculoskeletal ROS        Neurology  Negative neurology ROS      Psychology           Physical Exam    Airway    Mallampati score: II  TM Distance: <3 FB  Neck ROM: full     Dental   No notable dental hx     Cardiovascular  Comment: Negative ROS, Rhythm: regular, Rate: normal, Cardiovascular exam normal    Pulmonary  Pulmonary exam normal     Other Findings        Anesthesia Plan  ASA Score- 3     Anesthesia Type- IV sedation with anesthesia with ASA Monitors  Additional Monitors:   Airway Plan:         Plan Factors-    Induction- intravenous  Postoperative Plan-     Informed Consent- Anesthetic plan and risks discussed with patient

## 2019-09-24 ENCOUNTER — TELEPHONE (OUTPATIENT)
Dept: GASTROENTEROLOGY | Facility: AMBULARY SURGERY CENTER | Age: 24
End: 2019-09-24

## 2019-09-24 DIAGNOSIS — R10.13 DYSPEPSIA: ICD-10-CM

## 2019-09-24 RX ORDER — OMEPRAZOLE 40 MG/1
40 CAPSULE, DELAYED RELEASE ORAL DAILY
Qty: 30 CAPSULE | Refills: 5 | Status: SHIPPED | OUTPATIENT
Start: 2019-09-24 | End: 2019-09-27 | Stop reason: SDUPTHER

## 2019-09-24 NOTE — TELEPHONE ENCOUNTER
Refill Request for Medication: OMEPRAZOLE    Dose: 40MG    Quantity:     Pharmacy: [facility]

## 2019-09-27 DIAGNOSIS — R10.13 DYSPEPSIA: ICD-10-CM

## 2019-09-27 RX ORDER — OMEPRAZOLE 40 MG/1
40 CAPSULE, DELAYED RELEASE ORAL DAILY
Qty: 30 CAPSULE | Refills: 5 | Status: SHIPPED | OUTPATIENT
Start: 2019-09-27 | End: 2020-05-22 | Stop reason: SDUPTHER

## 2020-01-20 ENCOUNTER — HOSPITAL ENCOUNTER (EMERGENCY)
Facility: HOSPITAL | Age: 25
Discharge: HOME | End: 2020-01-20
Attending: EMERGENCY MEDICINE

## 2020-01-20 VITALS
SYSTOLIC BLOOD PRESSURE: 166 MMHG | BODY MASS INDEX: 42.34 KG/M2 | DIASTOLIC BLOOD PRESSURE: 86 MMHG | WEIGHT: 315 LBS | HEART RATE: 85 BPM | OXYGEN SATURATION: 99 % | TEMPERATURE: 98.5 F | RESPIRATION RATE: 18 BRPM

## 2020-01-20 DIAGNOSIS — R11.2 NAUSEA AND VOMITING: Primary | ICD-10-CM

## 2020-01-20 PROCEDURE — 99283 EMERGENCY DEPT VISIT LOW MDM: CPT

## 2020-01-20 PROCEDURE — 99283 EMERGENCY DEPT VISIT LOW MDM: CPT | Performed by: EMERGENCY MEDICINE

## 2020-01-20 RX ORDER — ONDANSETRON 4 MG/1
4 TABLET, ORALLY DISINTEGRATING ORAL EVERY 6 HOURS PRN
Qty: 20 TABLET | Refills: 0 | Status: SHIPPED | OUTPATIENT
Start: 2020-01-20 | End: 2020-09-01

## 2020-01-20 RX ORDER — ONDANSETRON 4 MG/1
4 TABLET, ORALLY DISINTEGRATING ORAL ONCE
Status: COMPLETED | OUTPATIENT
Start: 2020-01-20 | End: 2020-01-20

## 2020-01-20 RX ADMIN — ONDANSETRON 4 MG: 4 TABLET, ORALLY DISINTEGRATING ORAL at 13:01

## 2020-01-20 NOTE — ED PROVIDER NOTES
History  Chief Complaint   Patient presents with    Abdominal Pain     Pt  reports generalized abdominal pain  Pt  reports one episode of vomitting  Pt  reports hx of abdominal inflamation  29 YO male presents with nausea that began this morning  Pt states this was gradual in onset, constant  He did have 1 episode of NBNB emesis, states he feels as if he could vomit again  Pt states his stomach feels upset but he does not have uzma pain  Girlfriend states he seemed to feel warm but he did not take his temperature  Pt denies diarrhea or constipation, states he feels well hydrated  He notes his main concern was the flu as he did not get a vaccination this past year  Pt denies URI symptoms and has no body aches  Pt denies CP/SOB/F/C/D/C, no dysuria, burning on urination or blood in urine  History provided by:  Patient and significant other   used: No    Nausea   The primary symptoms include nausea  Primary symptoms do not include fever, abdominal pain, vomiting, dysuria or rash  The illness began today  The onset was gradual  Progression since onset: Waxing and Waning  The illness does not include chills, dysphagia, bloating, constipation or back pain  Prior to Admission Medications   Prescriptions Last Dose Informant Patient Reported? Taking?   omeprazole (PriLOSEC) 40 MG capsule   No No   Sig: Take 1 capsule (40 mg total) by mouth daily      Facility-Administered Medications: None       Past Medical History:   Diagnosis Date    GERD (gastroesophageal reflux disease)     Morbid obesity ([facility] [address] )        Past Surgical History:   Procedure Laterality Date    ADENOIDECTOMY      MYRINGOTOMY W/ TUBES      ND ESOPHAGOGASTRODUODENOSCOPY TRANSORAL DIAGNOSTIC N/A 3/21/2019    Procedure: ESOPHAGOGASTRODUODENOSCOPY (EGD) with bx;  Surgeon: Ingris Salgado MD;  Location: [facility];   Service: Gastroenterology       Family History   Problem Relation Age of Onset    Coronary artery disease Maternal Grandfather     Diabetes type II Maternal Grandfather         Mellitus    Hypertension Maternal Grandfather     Cancer Paternal Grandmother         Unknown    Heart attack Paternal Grandfather         Myocardial Infarction    Diabetes type II Family         Mellitus    Hypertension Family      I have reviewed and agree with the history as documented  Social History     Tobacco Use    Smoking status: Never Smoker    Smokeless tobacco: Never Used    Tobacco comment: No passive smoke exposure   Substance Use Topics    Alcohol use: Yes     Comment: socially    Drug use: No        Review of Systems   Constitutional: Negative for chills and fever  HENT: Negative for dental problem  Eyes: Negative for visual disturbance  Respiratory: Negative for shortness of breath  Cardiovascular: Negative for chest pain  Gastrointestinal: Positive for nausea  Negative for abdominal pain, bloating, constipation, dysphagia and vomiting  Genitourinary: Negative for dysuria and frequency  Musculoskeletal: Negative for back pain, neck pain and neck stiffness  Skin: Negative for rash  Neurological: Negative for dizziness, weakness and light-headedness  Psychiatric/Behavioral: Negative for agitation, behavioral problems and confusion  All other systems reviewed and are negative  Physical Exam  Physical Exam   Constitutional: He is oriented to person, place, and time  He appears well-developed and well-nourished  HENT:   Head: Normocephalic and atraumatic  Eyes: EOM are normal    Neck: Normal range of motion  Cardiovascular: Normal rate, regular rhythm and normal heart sounds  Pulmonary/Chest: Effort normal and breath sounds normal    Abdominal: Soft  There is no tenderness  Musculoskeletal: Normal range of motion  Neurological: He is alert and oriented to person, place, and time  Skin: Skin is warm and dry  Psychiatric: He has a normal mood and affect   His behavior is normal  Nursing note and vitals reviewed  Vital Signs  ED Triage Vitals [01/20/20 1143]   Temperature Pulse Respirations Blood Pressure SpO2   98 5 °F (36 9 °C) 85 18 166/86 99 %      Temp Source Heart Rate Source Patient Position - Orthostatic VS BP Location FiO2 (%)   Temporal Monitor Sitting Right arm --      Pain Score       4           Vitals:    01/20/20 1143   BP: 166/86   Pulse: 85   Patient Position - Orthostatic VS: Sitting         Visual Acuity  Visual Acuity      Most Recent Value   L Pupil Size (mm)  2   R Pupil Size (mm)  2          ED Medications  Medications   ondansetron (ZOFRAN-ODT) dispersible tablet 4 mg (4 mg Oral Given 1/20/20 1301)       Diagnostic Studies  Results Reviewed     None                 No orders to display              Procedures  Procedures         ED Course                               MDM  Number of Diagnoses or Management Options  Nausea and vomiting: new and does not require workup  Diagnosis management comments: 1  Nausea with vomiting - Pt with nausea this morning, one episode of emesis  Abdomen is non-tender throughout, he has no bodyaches, URI symptoms or fever to raise concern for flu  Will give Zofran  Amount and/or Complexity of Data Reviewed  Obtain history from someone other than the patient: yes    Patient Progress  Patient progress: improved        Disposition  Final diagnoses:   Nausea and vomiting     Time reflects when diagnosis was documented in both MDM as applicable and the Disposition within this note     Time User Action Codes Description Comment    1/20/2020 12:53 PM Ambar REED Add [R11 2] Nausea and vomiting       ED Disposition     ED Disposition Condition Date/Time Comment    Discharge Stable Mon Jan 20, 2020 12:53 PM Saul Reilly  discharge to home/self care              Follow-up Information    None         Discharge Medication List as of 1/20/2020 12:53 PM      START taking these medications    Details   ondansetron (ZOFRAN-ODT) 4 mg disintegrating tablet Take 1 tablet (4 mg total) by mouth every 6 (six) hours as needed for nausea, Starting Mon 1/20/2020, Print         CONTINUE these medications which have NOT CHANGED    Details   omeprazole (PriLOSEC) 40 MG capsule Take 1 capsule (40 mg total) by mouth daily, Starting Fri 9/27/2019, Normal           No discharge procedures on file      ED Provider  Electronically Signed by           Monica Amato MD  01/20/20 8767

## 2020-04-07 ENCOUNTER — APPOINTMENT (EMERGENCY)
Dept: RADIOLOGY | Facility: HOSPITAL | Age: 25
End: 2020-04-07
Payer: COMMERCIAL

## 2020-04-07 ENCOUNTER — HOSPITAL ENCOUNTER (EMERGENCY)
Facility: HOSPITAL | Age: 25
Discharge: HOME | End: 2020-04-07
Attending: EMERGENCY MEDICINE | Admitting: EMERGENCY MEDICINE
Payer: COMMERCIAL

## 2020-04-07 VITALS
HEART RATE: 67 BPM | WEIGHT: 315 LBS | BODY MASS INDEX: 42.46 KG/M2 | OXYGEN SATURATION: 99 % | DIASTOLIC BLOOD PRESSURE: 81 MMHG | SYSTOLIC BLOOD PRESSURE: 139 MMHG | RESPIRATION RATE: 18 BRPM

## 2020-04-07 DIAGNOSIS — R07.9 CHEST PAIN WITH LOW RISK OF ACUTE CORONARY SYNDROME: Primary | ICD-10-CM

## 2020-04-07 LAB
ATRIAL RATE: 71 BPM
P AXIS: 66 DEGREES
PR INTERVAL: 130 MS
QRS AXIS: 54 DEGREES
QRSD INTERVAL: 98 MS
QT INTERVAL: 372 MS
QTC INTERVAL: 404 MS
T WAVE AXIS: 74 DEGREES
VENTRICULAR RATE: 71 BPM

## 2020-04-07 PROCEDURE — 93005 ELECTROCARDIOGRAM TRACING: CPT

## 2020-04-07 PROCEDURE — 96372 THER/PROPH/DIAG INJ SC/IM: CPT

## 2020-04-07 PROCEDURE — 71046 X-RAY EXAM CHEST 2 VIEWS: CPT

## 2020-04-07 PROCEDURE — 93010 ELECTROCARDIOGRAM REPORT: CPT

## 2020-04-07 PROCEDURE — 99285 EMERGENCY DEPT VISIT HI MDM: CPT

## 2020-04-07 PROCEDURE — 99285 EMERGENCY DEPT VISIT HI MDM: CPT | Performed by: EMERGENCY MEDICINE

## 2020-04-07 RX ORDER — KETOROLAC TROMETHAMINE 30 MG/ML
15 INJECTION, SOLUTION INTRAMUSCULAR; INTRAVENOUS ONCE
Status: COMPLETED | OUTPATIENT
Start: 2020-04-07 | End: 2020-04-07

## 2020-04-07 RX ORDER — NAPROXEN 250 MG/1
250 TABLET ORAL
Qty: 21 TABLET | Refills: 0 | Status: SHIPPED | OUTPATIENT
Start: 2020-04-07 | End: 2020-05-27

## 2020-04-07 RX ADMIN — KETOROLAC TROMETHAMINE 15 MG: 30 INJECTION, SOLUTION INTRAMUSCULAR at 16:13

## 2020-05-22 DIAGNOSIS — R10.13 DYSPEPSIA: ICD-10-CM

## 2020-05-22 RX ORDER — OMEPRAZOLE 40 MG/1
40 CAPSULE, DELAYED RELEASE ORAL DAILY
Qty: 30 CAPSULE | Refills: 0 | Status: SHIPPED | OUTPATIENT
Start: 2020-05-22 | End: 2020-05-27

## 2020-05-22 RX ORDER — OMEPRAZOLE 40 MG/1
40 CAPSULE, DELAYED RELEASE ORAL DAILY
Qty: 30 CAPSULE | Refills: 0 | Status: CANCELLED | OUTPATIENT
Start: 2020-05-22

## 2020-05-27 ENCOUNTER — TELEMEDICINE (OUTPATIENT)
Dept: GASTROENTEROLOGY | Facility: MEDICAL CENTER | Age: 25
End: 2020-05-27
Payer: COMMERCIAL

## 2020-05-27 ENCOUNTER — TELEPHONE (OUTPATIENT)
Dept: GASTROENTEROLOGY | Facility: MEDICAL CENTER | Age: 25
End: 2020-05-27

## 2020-05-27 DIAGNOSIS — K59.04 CHRONIC IDIOPATHIC CONSTIPATION: ICD-10-CM

## 2020-05-27 DIAGNOSIS — R10.13 DYSPEPSIA: Primary | ICD-10-CM

## 2020-05-27 DIAGNOSIS — K21.9 GASTROESOPHAGEAL REFLUX DISEASE WITHOUT ESOPHAGITIS: ICD-10-CM

## 2020-05-27 PROCEDURE — 99214 OFFICE O/P EST MOD 30 MIN: CPT | Performed by: INTERNAL MEDICINE

## 2020-05-27 RX ORDER — OMEPRAZOLE 40 MG/1
40 CAPSULE, DELAYED RELEASE ORAL 2 TIMES DAILY
Qty: 30 CAPSULE | Refills: 0 | Status: SHIPPED | OUTPATIENT
Start: 2020-05-27 | End: 2020-05-27

## 2020-05-27 RX ORDER — OMEPRAZOLE 40 MG/1
40 CAPSULE, DELAYED RELEASE ORAL 2 TIMES DAILY
Qty: 30 CAPSULE | Refills: 7 | Status: SHIPPED | OUTPATIENT
Start: 2020-05-27 | End: 2020-06-16 | Stop reason: SDUPTHER

## 2020-06-02 ENCOUNTER — DOCUMENTATION (OUTPATIENT)
Dept: GASTROENTEROLOGY | Facility: MEDICAL CENTER | Age: 25
End: 2020-06-02

## 2020-06-09 ENCOUNTER — APPOINTMENT (OUTPATIENT)
Dept: URGENT CARE | Age: 25
End: 2020-06-09
Payer: OTHER MISCELLANEOUS

## 2020-06-09 ENCOUNTER — APPOINTMENT (OUTPATIENT)
Dept: RADIOLOGY | Age: 25
End: 2020-06-09
Payer: OTHER MISCELLANEOUS

## 2020-06-09 DIAGNOSIS — M54.50 ACUTE MIDLINE LOW BACK PAIN WITHOUT SCIATICA: ICD-10-CM

## 2020-06-09 DIAGNOSIS — M54.50 ACUTE MIDLINE LOW BACK PAIN WITHOUT SCIATICA: Primary | ICD-10-CM

## 2020-06-09 PROCEDURE — 72100 X-RAY EXAM L-S SPINE 2/3 VWS: CPT

## 2020-06-09 PROCEDURE — G0382 LEV 3 HOSP TYPE B ED VISIT: HCPCS | Performed by: NURSE PRACTITIONER

## 2020-06-09 PROCEDURE — 99283 EMERGENCY DEPT VISIT LOW MDM: CPT | Performed by: NURSE PRACTITIONER

## 2020-06-16 ENCOUNTER — APPOINTMENT (OUTPATIENT)
Dept: URGENT CARE | Age: 25
End: 2020-06-16
Payer: OTHER MISCELLANEOUS

## 2020-06-16 DIAGNOSIS — R10.13 DYSPEPSIA: ICD-10-CM

## 2020-06-16 PROCEDURE — 99214 OFFICE O/P EST MOD 30 MIN: CPT | Performed by: PREVENTIVE MEDICINE

## 2020-06-16 RX ORDER — OMEPRAZOLE 40 MG/1
40 CAPSULE, DELAYED RELEASE ORAL 2 TIMES DAILY
Qty: 30 CAPSULE | Refills: 7 | Status: SHIPPED | OUTPATIENT
Start: 2020-06-16 | End: 2020-09-01

## 2020-06-21 ENCOUNTER — APPOINTMENT (EMERGENCY)
Dept: RADIOLOGY | Facility: HOSPITAL | Age: 25
End: 2020-06-21
Payer: COMMERCIAL

## 2020-06-21 ENCOUNTER — HOSPITAL ENCOUNTER (EMERGENCY)
Facility: HOSPITAL | Age: 25
Discharge: HOME | End: 2020-06-21
Attending: EMERGENCY MEDICINE | Admitting: EMERGENCY MEDICINE
Payer: COMMERCIAL

## 2020-06-21 VITALS
OXYGEN SATURATION: 98 % | SYSTOLIC BLOOD PRESSURE: 145 MMHG | TEMPERATURE: 98.6 F | WEIGHT: 296.3 LBS | DIASTOLIC BLOOD PRESSURE: 94 MMHG | HEART RATE: 95 BPM | RESPIRATION RATE: 20 BRPM | BODY MASS INDEX: 38.04 KG/M2
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
## 2020-06-21 DIAGNOSIS — M54.50 ACUTE MIDLINE LOW BACK PAIN WITHOUT SCIATICA: ICD-10-CM

## 2020-06-21 PROCEDURE — 72100 X-RAY EXAM L-S SPINE 2/3 VWS: CPT

## 2020-06-21 PROCEDURE — 99284 EMERGENCY DEPT VISIT MOD MDM: CPT

## 2020-06-21 PROCEDURE — 99284 EMERGENCY DEPT VISIT MOD MDM: CPT | Performed by: EMERGENCY MEDICINE

## 2020-06-21 RX ORDER — NAPROXEN 500 MG/1
500 TABLET ORAL 2 TIMES DAILY WITH MEALS
Qty: 30 TABLET | Refills: 0 | Status: SHIPPED | OUTPATIENT
Start: 2020-06-21 | End: 2020-09-01

## 2020-06-21 RX ORDER — LIDOCAINE 50 MG/G
1 PATCH TOPICAL DAILY
Qty: 6 PATCH | Refills: 0 | Status: SHIPPED | OUTPATIENT
Start: 2020-06-21 | End: 2020-09-01

## 2020-06-21 RX ORDER — LIDOCAINE 50 MG/G
1 PATCH TOPICAL ONCE
Status: DISCONTINUED | OUTPATIENT
Start: 2020-06-21 | End: 2020-06-21 | Stop reason: HOSPADM

## 2020-06-21 RX ADMIN — LIDOCAINE 1 PATCH: 50 PATCH TOPICAL at 21:16

## 2020-06-30 ENCOUNTER — APPOINTMENT (OUTPATIENT)
Dept: URGENT CARE | Age: 25
End: 2020-06-30
Payer: OTHER MISCELLANEOUS

## 2020-06-30 PROCEDURE — 99213 OFFICE O/P EST LOW 20 MIN: CPT | Performed by: PREVENTIVE MEDICINE

## 2020-07-13 ENCOUNTER — APPOINTMENT (OUTPATIENT)
Dept: URGENT CARE | Age: 25
End: 2020-07-13
Payer: OTHER MISCELLANEOUS

## 2020-07-13 PROCEDURE — 99213 OFFICE O/P EST LOW 20 MIN: CPT | Performed by: PREVENTIVE MEDICINE

## 2020-07-23 ENCOUNTER — APPOINTMENT (OUTPATIENT)
Dept: URGENT CARE | Age: 25
End: 2020-07-23
Payer: OTHER MISCELLANEOUS

## 2020-07-23 PROCEDURE — 99213 OFFICE O/P EST LOW 20 MIN: CPT | Performed by: PREVENTIVE MEDICINE

## 2020-08-03 ENCOUNTER — APPOINTMENT (OUTPATIENT)
Dept: URGENT CARE | Age: 25
End: 2020-08-03
Payer: OTHER MISCELLANEOUS

## 2020-08-03 PROCEDURE — 99213 OFFICE O/P EST LOW 20 MIN: CPT | Performed by: PREVENTIVE MEDICINE

## 2020-08-04 ENCOUNTER — TELEPHONE (OUTPATIENT)
Dept: PAIN MEDICINE | Facility: MEDICAL CENTER | Age: 25
End: 2020-08-04

## 2020-08-04 NOTE — TELEPHONE ENCOUNTER
Patients work  wanted him to be seen asap  She asked if he could see another Dr Erika Rodriguez is scheduled on 8/14/20 w/ Dr Dean  if there needs to be any changes follow up with patient

## 2020-08-04 NOTE — TELEPHONE ENCOUNTER
Patient was seen at [address] [place] [address], Please schedule a consult with Dr Cristy Roque at the [facility] office  [facility]

## 2020-08-05 NOTE — TELEPHONE ENCOUNTER
WC claim is open    The body part is the lower back  Also since [place] are not required to say if they will or will not pay for any appts or testing  They ask for the bill along with medical recs for review  If it meets their guidelines they can pay for it  [place] state [place] torre not require a per [facility]- [phone] ext [phone]

## 2020-08-14 ENCOUNTER — CONSULT (OUTPATIENT)
Dept: PAIN MEDICINE | Facility: MEDICAL CENTER | Age: 25
End: 2020-08-14
Payer: OTHER MISCELLANEOUS

## 2020-08-14 VITALS
WEIGHT: 279 LBS | RESPIRATION RATE: 16 BRPM | HEIGHT: 74 IN | HEART RATE: 82 BPM | BODY MASS INDEX: 35.81 KG/M2 | SYSTOLIC BLOOD PRESSURE: 125 MMHG | TEMPERATURE: 98.3 F | DIASTOLIC BLOOD PRESSURE: 69 MMHG

## 2020-08-14 DIAGNOSIS — S39.012A STRAIN OF LUMBAR REGION, INITIAL ENCOUNTER: Primary | ICD-10-CM

## 2020-08-14 PROCEDURE — 3008F BODY MASS INDEX DOCD: CPT | Performed by: INTERNAL MEDICINE

## 2020-08-14 PROCEDURE — 99244 OFF/OP CNSLTJ NEW/EST MOD 40: CPT | Performed by: PHYSICAL MEDICINE & REHABILITATION

## 2020-08-14 NOTE — PROGRESS NOTES
Assessment  1  Strain of lumbar region, initial encounter        Plan  1  Continue with occupational medicine for conservative management of his lumbar strain  He does not require any interventional approaches at this time period work restrictions per Occupational Medicine as well  Patient is welcome to follow up at any time if there are changes in his symptoms that require reevaluation  My impressions and treatment recommendations were discussed in detail with the patient who verbalized understanding and had no further questions  Discharge instructions were provided  I personally saw and examined the patient and I agree with the above discussed plan of care  No orders of the defined types were placed in this encounter  New Medications Ordered This Visit   Medications    esomeprazole (NexIUM) 20 mg capsule     Sig: Take 20 mg by mouth       History of Present Illness    Kelley Caro  is a 30 y o  male seen in consultation at the request of Occupational Medicine regarding back injury which occurred in June of 2020  He describes mild pain at this time rated as a 4/10 which is worse with bending and twisting  This is nearly constant without any pattern and described as a dull aching sensation in his lower back  He denies any radicular features  Aggravating factors include standing, bending, sitting, exercise  He is unable to determine any significant alleviating factors  Diagnostic studies include x-rays as well as MRI  MRI reveals some minor disc bulging but no significant central canal or foraminal narrowing  No nerve root irritation  He has been participating appropriately with physical therapy and exercise in noticing some moderate relief  No relief with local heat or ice application  He had been using NSAIDs as well as muscle relaxers and not noticing significant benefit from these          I have personally reviewed and/or updated the patient's past medical history, past surgical history, family history, social history, current medications, allergies, and vital signs today  Review of Systems   Constitutional: Negative for fever and unexpected weight change  HENT: Negative for trouble swallowing  Eyes: Negative for visual disturbance  Respiratory: Negative for shortness of breath and wheezing  Cardiovascular: Negative for chest pain and palpitations  Gastrointestinal: Negative for constipation, diarrhea, nausea and vomiting  Endocrine: Negative for cold intolerance, heat intolerance and polydipsia  Genitourinary: Negative for difficulty urinating and frequency  Musculoskeletal: Positive for back pain, gait problem and myalgias  Negative for arthralgias and joint swelling  Skin: Negative for rash  Neurological: Negative for dizziness, seizures, syncope, weakness and headaches  Hematological: Does not bruise/bleed easily  Psychiatric/Behavioral: Positive for sleep disturbance  Negative for dysphoric mood  All other systems reviewed and are negative  Patient Active Problem List   Diagnosis    Dyspepsia    GERD (gastroesophageal reflux disease)    Chronic idiopathic constipation       Past Medical History:   Diagnosis Date    Chronic pain disorder     GERD (gastroesophageal reflux disease)     Low back pain     Morbid obesity ([address] )        Past Surgical History:   Procedure Laterality Date    ADENOIDECTOMY      MYRINGOTOMY W/ TUBES      ME ESOPHAGOGASTRODUODENOSCOPY TRANSORAL DIAGNOSTIC N/A 3/21/2019    Procedure: ESOPHAGOGASTRODUODENOSCOPY (EGD) with bx;  Surgeon: Jayce White MD;  Location: [facility];   Service: Gastroenterology       Family History   Problem Relation Age of Onset    Coronary artery disease Maternal Grandfather     Diabetes type II Maternal Grandfather         Mellitus    Hypertension Maternal Grandfather     Cancer Paternal Grandmother         Unknown    Heart attack Paternal Grandfather         Myocardial Infarction    Diabetes type II Family         Mellitus    Hypertension Family     No Known Problems Mother     No Known Problems Father        Social History     Occupational History    Not on file   Tobacco Use    Smoking status: Never Smoker    Smokeless tobacco: Never Used    Tobacco comment: No passive smoke exposure   Substance and Sexual Activity    Alcohol use: Not Currently     Frequency: Never     Comment: socially    Drug use: No    Sexual activity: Not on file       Current Outpatient Medications on File Prior to Visit   Medication Sig    omeprazole (PriLOSEC) 40 MG capsule Take 1 capsule (40 mg total) by mouth 2 (two) times a day Please schedule follow up appointment with Dr Petra Diaz around 7/27/20 & complete blood work as ordered   esomeprazole (NexIUM) 20 mg capsule Take 20 mg by mouth    lidocaine (LIDODERM) 5 % Apply 1 patch topically daily Remove & Discard patch within 12 hours or as directed by MD (Patient not taking: Reported on 8/14/2020)    naproxen (NAPROSYN) 500 mg tablet Take 1 tablet (500 mg total) by mouth 2 (two) times a day with meals (Patient not taking: Reported on 8/14/2020)    ondansetron (ZOFRAN-ODT) 4 mg disintegrating tablet Take 1 tablet (4 mg total) by mouth every 6 (six) hours as needed for nausea (Patient not taking: Reported on 8/14/2020)     No current facility-administered medications on file prior to visit  Allergies   Allergen Reactions    Adderall [Amphetamine-Dextroamphetamine] Hallucinations    Latex     Other        Physical Exam    /69   Pulse 82   Temp 98 3 °F (36 8 °C)   Resp 16   Ht 6' 2" (1 88 m)   Wt 127 kg (279 lb)   BMI 35 82 kg/m²     LUMBAR  General: Well-developed, well-nourished individual in no acute distress  Mental: Appropriate mood and affect  Grossly oriented with coherent speech and thought processing   Neuro:  Cranial nerves: Cranial nerve function is grossly intact bilaterally     Strength: Bilateral lower extremity strength is normal and symmetric   No atrophy or tone abnormalities noted   Reflexes: Bilateral lower extremity muscle stretch reflexes are physiologic and symmetric   No ankle clonus is noted   Sensation: No loss of sensation is noted   SLR/Foraminal Compression Maneuvers: Straight leg raising in the sitting position is negative for radicular pain   Gait:  Gait/gross motor: Gait is normal  Station is normal  Toe walking, heel walking  are normal     Musculoskeletal:  Palpation: Inspection and palpation of the spine and extremities are unremarkable except for tenderness to palpation along the lumbar paraspinal musculature bilaterally reproducing his symptoms  Spine: Normal pain-free range of motion except for forward flexion which is significantly limited in reproduces pain complaint  No gross axial skeletal deformities   Skin: Skin inspection grossly negative for erythema, breakdown, or concerning lesions in affected area   Lymph: No lymphadenopathy is appreciated in the involved extremity   Vessels: No lower extremity edema   Lungs: Breathing is comfortable and regular  No dyspnea noted during examination   Eyes: Visual field grossly intact to confrontation  No redness appreciated  ENT: No craniofacial deformities or asymmetry  No neck masses appreciated  Imaging    Main Report    Report Date  Radiologist  Images    Jul 30,2020 11:18  Samuel Tolentino MD   0    Approval Date  Approved by       Jul 30,2020 14:23  Leida aRsmussen MD          OBSERVATION     MRI LUMBAR SPINE  HISTORY: Progressive low back pain  TECHNIQUE: Sagittal and axial T1 and T2 weighted FSE images are obtained through the lumbar spine  FINDINGS: Vertebral body signal and vertebral body height are preserved  The overall canal size is unremarkable  The conus medullaris is clear   There is some loss of disc signal and disc height at L1-2 and to a lesser degree at T12-L1 with shallow disc protrusion to the left identified at the L1-2 level  At L2-3 and L3-4, there is normal disc signal and disc height  There is 2 mm disc bulging toward the left neural foramen at both levels  At L4-5 and L5-S1, there is loss of disc signal and loss disc height  At both levels, there is diffuse broad-based disc protrusion reducing canal diameter eccentric to the left  Findings are more noticeable and compromised at the left neural foramen to a greater degree at L4-5  IMPRESSION:  1  Diffuse broad-based disc protrusion reducing canal diameter clearly eccentric to the left at L4-5    2  Broad disc protrusion reducing canal diameter eccentric to the left as described at L5-S1    3  Shallow disc protrusion toward the left neural foramen at L2-3 and L3-4    4  Disc protrusion at L1-2 and to some degree at T12-L1 at the periphery of the field of view  5  No evidence of fracture/dislocation, marrow replacing process or intraspinal/paraspinous mass  JS/pp  Released by PATRIC Pardo                Study Result     LUMBAR SPINE     INDICATION:   Back injury      COMPARISON:  Lumbar spine x-ray dated June 9, 2020      VIEWS:  XR SPINE LUMBAR 2 OR 3 VIEWS INJURY        FINDINGS:     There are 5 non rib bearing lumbar vertebral bodies       There is no evidence of acute fracture or destructive osseous lesion      Alignment is unremarkable       No significant lumbar degenerative change noted      The pedicles appear intact      Soft tissues are unremarkable      IMPRESSION:     No lumbar spine fracture or subluxation         Workstation performed: [id number]

## 2020-08-14 NOTE — PATIENT INSTRUCTIONS
Muscle Strain   WHAT YOU NEED TO KNOW:   A muscle strain is a twist, pull, or tear of a muscle or tendon  A tendon is a strong elastic tissue that connects a muscle to a bone  Signs of a strained muscle include bruising and swelling over the area, pain with movement, and loss of strength  DISCHARGE INSTRUCTIONS:   Return to the emergency department if:   · You suddenly cannot feel or move your injured muscle  Contact your healthcare provider if:   · Your pain and swelling worsen or do not go away  · You have questions or concerns about your condition or care  Medicines:   · NSAIDs  help decrease swelling and pain or fever  This medicine is available with or without a doctor's order  NSAIDs can cause stomach bleeding or kidney problems in certain people  If you take blood thinner medicine, always ask your healthcare provider if NSAIDs are safe for you  Always read the medicine label and follow directions  · Muscle relaxers  help decrease pain and muscle spasms  · Take your medicine as directed  Contact your healthcare provider if you think your medicine is not helping or if you have side effects  Tell him of her if you are allergic to any medicine  Keep a list of the medicines, vitamins, and herbs you take  Include the amounts, and when and why you take them  Bring the list or the pill bottles to follow-up visits  Carry your medicine list with you in case of an emergency  Follow up with your healthcare provider as directed: Your healthcare provider may suggest that you have a follow-up visit before you go back to your usual activity  Write down your questions so you remember to ask them during your visits  Self-care:   · 3 to 7 days after the injury:  Use Rest, Ice, Compression, and Elevation (RICE) to help stop bruising and decrease pain and swelling  ¨ Rest:  Rest your muscle to allow your injury to heal  When the pain decreases, begin normal, slow movements   For mild and moderate muscle strains, you should rest your muscles for about 2 days  However, if you have a severe muscle strain, you should rest for 10 to 14 days  You may need to use crutches to walk if your muscle strain is in your legs or lower body  ¨ Ice:  Put an ice pack on the injured area  Put a towel between the ice pack and your skin  Do not put the ice pack directly on your skin  You can use a package of frozen peas instead of an ice pack  ¨ Compression:  You may need to wrap an elastic bandage around the area to decrease swelling  It should be tight enough for you to feel support  Do not wrap it too tightly  ¨ Elevation:  Keep the injured muscle raised above your heart if possible  For example if you have a strain of your lower leg muscle, lie down and prop your leg up on pillows  This helps decrease pain and swelling  · 3 to 21 days after the injury:  Start to slowly and regularly exercise your muscle  This will help it heal  If you feel pain, decrease how hard you are exercising  · 1 to 6 weeks after the injury:  Stretch the injured muscle  Hold the stretch for about 30 seconds  Do this 4 times a day  You may stretch the muscle until you feel a slight pull  Stop stretching if you feel pain  · 2 weeks to 6 months after the injury:  The goal of this phase is to return to the activity you were doing before the injury happened, without hurting the muscle again  · 3 weeks to 6 months after the injury:  Keep stretching and strengthening your muscles to avoid injury  Slowly increase the time and distance that you exercise  You may have signs and symptoms of muscle strain 6 months after the injury, even if you do things to help it heal  In this case, you may need surgery on the muscle  © 2017 [employer] Information is for End User's use only and may not be sold, redistributed or otherwise used for commercial purposes   All illustrations and images included in CareNotes® are the copyrighted property of [employer]  or Alex Bhatt  The above information is an  only  It is not intended as medical advice for individual conditions or treatments  Talk to your doctor, nurse or pharmacist before following any medical regimen to see if it is safe and effective for you

## 2020-08-23 ENCOUNTER — HOSPITAL ENCOUNTER (EMERGENCY)
Facility: HOSPITAL | Age: 25
Discharge: HOME | End: 2020-08-23
Attending: EMERGENCY MEDICINE
Payer: COMMERCIAL

## 2020-08-23 VITALS
RESPIRATION RATE: 18 BRPM | SYSTOLIC BLOOD PRESSURE: 144 MMHG | TEMPERATURE: 97 F | OXYGEN SATURATION: 97 % | DIASTOLIC BLOOD PRESSURE: 74 MMHG | HEART RATE: 74 BPM

## 2020-08-23 DIAGNOSIS — H60.92 LEFT OTITIS EXTERNA: Primary | ICD-10-CM

## 2020-08-23 PROCEDURE — 99283 EMERGENCY DEPT VISIT LOW MDM: CPT

## 2020-08-23 PROCEDURE — 99284 EMERGENCY DEPT VISIT MOD MDM: CPT | Performed by: EMERGENCY MEDICINE

## 2020-08-23 RX ORDER — AMOXICILLIN 500 MG/1
500 CAPSULE ORAL EVERY 12 HOURS SCHEDULED
Qty: 14 CAPSULE | Refills: 0 | Status: SHIPPED | OUTPATIENT
Start: 2020-08-23 | End: 2020-08-30

## 2020-08-23 RX ORDER — HYDROCODONE BITARTRATE AND ACETAMINOPHEN 5; 325 MG/1; MG/1
1 TABLET ORAL EVERY 12 HOURS PRN
Qty: 2 TABLET | Refills: 0 | Status: SHIPPED | OUTPATIENT
Start: 2020-08-23 | End: 2020-08-24

## 2020-08-23 NOTE — Clinical Note
Amalia Chambers was seen and treated in our emergency department on 8/23/2020  Diagnosis:     Laz Adkins  may return to work on return date  He may return on this date: 08/25/2020         If you have any questions or concerns, please don't hesitate to call        Ifeoma Michelle PA-C    ______________________________           _______________          _______________  Hospital Representative                              Date                                Time

## 2020-08-24 NOTE — ED PROVIDER NOTES
History  Chief Complaint   Patient presents with   Arno Glow     left ear pain     30year old male presents to the emergency department for evaluation of left ear pain  Patient reports the pain started 3 days ago, and has increased  He notes drainage from the ear yesterday  He states he has been taking aleeve without relief  He denies any fever, facial swelling, dental problem, swimming  He reports the pain interferes with his sleep  History provided by:  Patient   used: No    Earache   Location:  Left  Behind ear:  No abnormality  Quality:  Aching and sharp  Severity:  Severe  Onset quality:  Gradual  Duration:  3 days  Timing:  Constant  Progression:  Unchanged  Context: not direct blow, not elevation change, not foreign body in ear, not loud noise, not recent URI and not water in ear    Relieved by:  Nothing  Worsened by:  Nothing  Ineffective treatments:  OTC medications  Associated symptoms: ear discharge    Associated symptoms: no abdominal pain, no congestion, no cough, no fever, no headaches, no neck pain, no rash, no rhinorrhea, no sore throat, no tinnitus and no vomiting    Risk factors: no recent travel and no prior ear surgery        Prior to Admission Medications   Prescriptions Last Dose Informant Patient Reported? Taking?   esomeprazole (NexIUM) 20 mg capsule   Yes No   Sig: Take 20 mg by mouth   lidocaine (LIDODERM) 5 %   No No   Sig: Apply 1 patch topically daily Remove & Discard patch within 12 hours or as directed by MD   Patient not taking: Reported on 8/14/2020   naproxen (NAPROSYN) 500 mg tablet   No No   Sig: Take 1 tablet (500 mg total) by mouth 2 (two) times a day with meals   Patient not taking: Reported on 8/14/2020   omeprazole (PriLOSEC) 40 MG capsule   No No   Sig: Take 1 capsule (40 mg total) by mouth 2 (two) times a day Please schedule follow up appointment with Dr Cherlyn Severin around 7/27/20 & complete blood work as ordered     ondansetron (ZOFRAN-ODT) 4 mg disintegrating tablet   No No   Sig: Take 1 tablet (4 mg total) by mouth every 6 (six) hours as needed for nausea   Patient not taking: Reported on 8/14/2020      Facility-Administered Medications: None       Past Medical History:   Diagnosis Date    Chronic pain disorder     GERD (gastroesophageal reflux disease)     Low back pain     Morbid obesity ([address] )        Past Surgical History:   Procedure Laterality Date    ADENOIDECTOMY      MYRINGOTOMY W/ TUBES      [place] ESOPHAGOGASTRODUODENOSCOPY TRANSORAL DIAGNOSTIC N/A 3/21/2019    Procedure: ESOPHAGOGASTRODUODENOSCOPY (EGD) with bx;  Surgeon: Ahmed Galeazzi, MD;  Location: [facility]; Service: Gastroenterology       Family History   Problem Relation Age of Onset    Coronary artery disease Maternal Grandfather     Diabetes type II Maternal Grandfather         Mellitus    Hypertension Maternal Grandfather     Cancer Paternal Grandmother         Unknown    Heart attack Paternal Grandfather         Myocardial Infarction    Diabetes type II Family         Mellitus    Hypertension Family     No Known Problems Mother     No Known Problems Father      I have reviewed and agree with the history as documented  E-Cigarette/Vaping    E-Cigarette Use Never User      E-Cigarette/Vaping Substances    Nicotine No     THC No     CBD No     Flavoring No     Other No     Unknown No      Social History     Tobacco Use    Smoking status: Never Smoker    Smokeless tobacco: Never Used    Tobacco comment: No passive smoke exposure   Substance Use Topics    Alcohol use: Not Currently     Frequency: Never     Comment: socially    Drug use: No       Review of Systems   Constitutional: Negative for chills and fever  HENT: Positive for ear discharge and ear pain  Negative for congestion, rhinorrhea, sore throat and tinnitus  Respiratory: Negative for cough  Gastrointestinal: Negative for abdominal pain and vomiting     Musculoskeletal: Negative for neck pain and neck stiffness  Skin: Negative for rash  Neurological: Negative for headaches  All other systems reviewed and are negative  Physical Exam  Physical Exam  Vitals signs and nursing note reviewed  Constitutional:       General: He is not in acute distress  Appearance: He is well-developed  He is not ill-appearing or toxic-appearing  HENT:      Head: Normocephalic and atraumatic  Right Ear: Hearing and external ear normal       Left Ear: Hearing and external ear normal  Swelling present  No drainage or tenderness  Tympanic membrane is erythematous and bulging  Nose: Nose normal    Eyes:      Conjunctiva/sclera: Conjunctivae normal    Neck:      Musculoskeletal: Full passive range of motion without pain and neck supple  Vascular: No JVD  Trachea: No tracheal deviation  Cardiovascular:      Rate and Rhythm: Normal rate and regular rhythm  Heart sounds: Normal heart sounds, S1 normal and S2 normal    Pulmonary:      Effort: Pulmonary effort is normal  No accessory muscle usage or respiratory distress  Breath sounds: Normal breath sounds  No stridor  No decreased breath sounds, wheezing or rales  Skin:     General: Skin is warm and dry  Capillary Refill: Capillary refill takes less than 2 seconds  Findings: No rash  Neurological:      Mental Status: He is alert and oriented to person, place, and time  GCS: GCS eye subscore is 4  GCS verbal subscore is 5  GCS motor subscore is 6  Psychiatric:         Mood and Affect: Mood is anxious           Speech: Speech normal          Vital Signs  ED Triage Vitals   Temperature Pulse Respirations Blood Pressure SpO2   08/23/20 2159 08/23/20 2201 08/23/20 2201 08/23/20 2201 08/23/20 2201   (!) 97 °F (36 1 °C) 74 18 144/74 97 %      Temp Source Heart Rate Source Patient Position - Orthostatic VS BP Location FiO2 (%)   08/23/20 2159 08/23/20 2201 08/23/20 2201 08/23/20 2201 --   Temporal Monitor Sitting Right arm Pain Score       08/23/20 2201       Worst Possible Pain           Vitals:    08/23/20 2201   BP: 144/74   Pulse: 74   Patient Position - Orthostatic VS: Sitting         Visual Acuity      ED Medications  Medications - No data to display    Diagnostic Studies  Results Reviewed     None                 No orders to display              Procedures  Procedures         ED Course                                             MDM  Number of Diagnoses or Management Options  Left otitis externa:   Diagnosis management comments: I or my delegate reviewed this patient through the [facility] portal and did not find any evidence of narcotic abuse or doctor shopping  The management plan was discussed in detail with the patient at bedside and all questions were answered  The prior to discharge, we provided both verbal and written instructions  We discussed with the patient the signs and symptoms for which to return to the emergency department  All questions were answered and patient was comfortable with the plan of care and discharged to home  Instructed the patient to follow up with the primary care provider and/or special as provided and their written instructions  The patient verbalized understanding of our discussion and plan of care, and agrees to return to the Emergency Department for concerns and progression of illness  Disposition  Final diagnoses:   Left otitis externa     Time reflects when diagnosis was documented in both MDM as applicable and the Disposition within this note     Time User Action Codes Description Comment    8/23/2020 10:17 PM Margaret Briceno Add [H60 92] Left otitis externa       ED Disposition     ED Disposition Condition Date/Time Comment    Discharge Stable Sun Aug 23, 2020 10:22 PM Tucker Galeas  discharge to home/self care              Follow-up Information    None         Discharge Medication List as of 8/23/2020 10:22 PM      START taking these medications    Details amoxicillin (AMOXIL) 500 mg capsule Take 1 capsule (500 mg total) by mouth every 12 (twelve) hours for 7 days, Starting Sun 8/23/2020, Until Sun 8/30/2020, Normal      HYDROcodone-acetaminophen (NORCO) 5-325 mg per tablet Take 1 tablet by mouth every 12 (twelve) hours as needed for pain for up to 1 dayMax Daily Amount: 2 tablets, Starting Sun 8/23/2020, Until Mon 8/24/2020, Normal         CONTINUE these medications which have NOT CHANGED    Details   esomeprazole (NexIUM) 20 mg capsule Take 20 mg by mouth, Historical Med      lidocaine (LIDODERM) 5 % Apply 1 patch topically daily Remove & Discard patch within 12 hours or as directed by MD, Starting Sun 6/21/2020, Normal      naproxen (NAPROSYN) 500 mg tablet Take 1 tablet (500 mg total) by mouth 2 (two) times a day with meals, Starting Sun 6/21/2020, Normal      omeprazole (PriLOSEC) 40 MG capsule Take 1 capsule (40 mg total) by mouth 2 (two) times a day Please schedule follow up appointment with Dr Jovany Moya around 7/27/20 & complete blood work as ordered  , Starting Tue 6/16/2020, Normal      ondansetron (ZOFRAN-ODT) 4 mg disintegrating tablet Take 1 tablet (4 mg total) by mouth every 6 (six) hours as needed for nausea, Starting Mon 1/20/2020, Print           No discharge procedures on file      PDMP Review       Value Time User    PDMP Reviewed  Yes 8/23/2020 10:17 PM Clyde Yang PA-C          ED Provider  Electronically Signed by           Clyde Yang PA-C  08/23/20 9340

## 2020-08-27 ENCOUNTER — APPOINTMENT (OUTPATIENT)
Dept: URGENT CARE | Age: 25
End: 2020-08-27
Payer: COMMERCIAL

## 2020-08-27 PROCEDURE — 99213 OFFICE O/P EST LOW 20 MIN: CPT | Performed by: PREVENTIVE MEDICINE

## 2020-09-01 ENCOUNTER — OFFICE VISIT (OUTPATIENT)
Dept: FAMILY MEDICINE CLINIC | Facility: CLINIC | Age: 25
End: 2020-09-01
Payer: COMMERCIAL

## 2020-09-01 VITALS
DIASTOLIC BLOOD PRESSURE: 70 MMHG | BODY MASS INDEX: 35.81 KG/M2 | HEIGHT: 74 IN | HEART RATE: 85 BPM | WEIGHT: 279 LBS | SYSTOLIC BLOOD PRESSURE: 120 MMHG | TEMPERATURE: 98.6 F | OXYGEN SATURATION: 98 %

## 2020-09-01 DIAGNOSIS — H72.92 PERFORATION OF LEFT TYMPANIC MEMBRANE: ICD-10-CM

## 2020-09-01 DIAGNOSIS — S33.5XXS SPRAIN OF LOW BACK, SEQUELA: Primary | ICD-10-CM

## 2020-09-01 PROBLEM — S33.5XXA LOW BACK SPRAIN: Status: ACTIVE | Noted: 2020-09-01

## 2020-09-01 PROBLEM — K52.9 GASTROENTERITIS: Status: ACTIVE | Noted: 2017-08-11

## 2020-09-01 PROCEDURE — 99203 OFFICE O/P NEW LOW 30 MIN: CPT | Performed by: FAMILY MEDICINE

## 2020-09-01 PROCEDURE — 3725F SCREEN DEPRESSION PERFORMED: CPT | Performed by: FAMILY MEDICINE

## 2020-09-01 PROCEDURE — 1036F TOBACCO NON-USER: CPT | Performed by: FAMILY MEDICINE

## 2020-09-01 NOTE — LETTER
September 1, 2020     Patient: Luisa Davies  YOB: 1995   Date of Visit: 9/1/2020       To Whom it May Concern:    Jia Saas is under my professional care  He was seen in my office on 9/1/2020  He may return to work on 9/7/2020  If you have any questions or concerns, please don't hesitate to call           Sincerely,          Анна Lim MD        CC: No Recipients

## 2020-09-01 NOTE — LETTER
September 1, 2020     Patient: Mary Almanza  YOB: 1995   Date of Visit: 9/1/2020       To Whom it May Concern:        Amalia Chambers is under my professional care  He was seen in my office on 9/1/2020  He may return to work without limitations 09/07/2020  If you have any questions or concerns, please don't hesitate to call               Sincerely,          Nicole Pratt MD        CC: No Recipients

## 2020-09-02 NOTE — PROGRESS NOTES
Assessment/Plan:    49-year-old male with:  Low back pain and left TM likely perforation with the ENT discussed supportive care return parameters otherwise and patient return to work advised to call back if not improving worsening  No problem-specific Assessment & Plan notes found for this encounter  Diagnoses and all orders for this visit:    Sprain of low back, sequela    Perforation of left tympanic membrane  -     Ambulatory Referral to Otolaryngology; Future          Subjective:     Chief Complaint   Patient presents with   [address] to our office    Back Pain     needs note to be cleared to go back to work, went to er for pain    Physical Exam     due for yearly exam        Patient ID: Mary Almanza  is a 30 y o  male  Patient is a 49-year-old male who presents to establish care in this practice  She admits that several months ago he was injured on the job and had low back pain he has been doing therapy for several months and he admits that he is back to his baseline would like to return to work on fevers chills nausea vomiting  No weakness numbness or tingling  Also admits some left earache he went to urgent care was diagnosed with an ear infection but he still having pain no fevers but still significant drainage  No other complaints at this time      The following portions of the patient's history were reviewed and updated as appropriate: allergies, current medications, past family history, past medical history, past social history, past surgical history and problem list     Review of Systems   Constitutional: Negative  HENT: Positive for ear pain  Eyes: Negative  Respiratory: Negative  Cardiovascular: Negative  Gastrointestinal: Negative  Endocrine: Negative  Genitourinary: Negative  Musculoskeletal: Negative  Allergic/Immunologic: Negative  Neurological: Negative  Hematological: Negative  Psychiatric/Behavioral: Negative      All other systems reviewed and are negative  Objective:      /70   Pulse 85   Temp 98 6 °F (37 °C)   Ht 6' 2" (1 88 m)   Wt 127 kg (279 lb)   SpO2 98%   BMI 35 82 kg/m²          Physical Exam  Constitutional:       Appearance: He is well-developed  HENT:      Head: Atraumatic  Right Ear: Tympanic membrane and external ear normal       Left Ear: External ear normal       Ears:      Comments: Significant drainage in the canal on is likely small perforation in the TM on the left side  Eyes:      Conjunctiva/sclera: Conjunctivae normal       Pupils: Pupils are equal, round, and reactive to light  Neck:      Musculoskeletal: Normal range of motion  Pulmonary:      Effort: Pulmonary effort is normal  No respiratory distress  Abdominal:      General: There is no distension  Musculoskeletal: Normal range of motion  Skin:     General: Skin is warm and dry  Neurological:      Mental Status: He is alert and oriented to person, place, and time  Cranial Nerves: No cranial nerve deficit  Psychiatric:         Behavior: Behavior normal          Thought Content: Thought content normal          Judgment: Judgment normal            BMI Counseling: Body mass index is 35 82 kg/m²  The BMI is above normal  Nutrition recommendations include encouraging healthy choices of fruits and vegetables  Exercise recommendations include moderate physical activity 150 minutes/week

## 2020-09-21 DIAGNOSIS — K21.9 GASTROESOPHAGEAL REFLUX DISEASE WITHOUT ESOPHAGITIS: Primary | ICD-10-CM

## 2020-09-21 RX ORDER — OMEPRAZOLE 40 MG/1
40 CAPSULE, DELAYED RELEASE ORAL
Qty: 60 CAPSULE | Refills: 5 | Status: SHIPPED | OUTPATIENT
Start: 2020-09-21 | End: 2021-06-25 | Stop reason: SDUPTHER

## 2020-09-21 NOTE — TELEPHONE ENCOUNTER
Spoke with patient  He clarified that he is taking OMEPRAZOLE 40 mg BID, not esomeprazole 20 mg      Script entered for omeprazole 40 mg BID and sent to [place] bin to be signed

## 2020-09-21 NOTE — TELEPHONE ENCOUNTER
GI Physician: Dr Coronado     Refill Request for Medication: esomeprazole    Dose: 20 mg    Quantity: ?     Pharmacy and Location: [facility]

## 2020-10-27 PROCEDURE — 87077 CULTURE AEROBIC IDENTIFY: CPT | Performed by: OTOLARYNGOLOGY

## 2020-10-27 PROCEDURE — 87186 SC STD MICRODIL/AGAR DIL: CPT | Performed by: OTOLARYNGOLOGY

## 2020-10-27 PROCEDURE — 87205 SMEAR GRAM STAIN: CPT | Performed by: OTOLARYNGOLOGY

## 2020-10-27 PROCEDURE — 87070 CULTURE OTHR SPECIMN AEROBIC: CPT | Performed by: OTOLARYNGOLOGY

## 2021-06-25 DIAGNOSIS — K21.9 GASTROESOPHAGEAL REFLUX DISEASE WITHOUT ESOPHAGITIS: ICD-10-CM

## 2021-06-25 RX ORDER — OMEPRAZOLE 40 MG/1
40 CAPSULE, DELAYED RELEASE ORAL
Qty: 180 CAPSULE | Refills: 0 | Status: SHIPPED | OUTPATIENT
Start: 2021-06-25 | End: 2021-12-28 | Stop reason: SDUPTHER

## 2021-06-25 NOTE — TELEPHONE ENCOUNTER
GI Physician: Dr Vickie Mims for Medication: Omeprazole    Dose: 40 mg    Quantity: 60    Pharmacy and Location: [address] [place] [address]

## 2021-09-09 ENCOUNTER — TELEPHONE (OUTPATIENT)
Dept: FAMILY MEDICINE CLINIC | Facility: CLINIC | Age: 26
End: 2021-09-09

## 2021-09-09 ENCOUNTER — TELEMEDICINE (OUTPATIENT)
Dept: FAMILY MEDICINE CLINIC | Facility: CLINIC | Age: 26
End: 2021-09-09

## 2021-09-09 VITALS — TEMPERATURE: 98.3 F

## 2021-09-09 DIAGNOSIS — A08.4 VIRAL GASTROENTERITIS: Primary | ICD-10-CM

## 2021-09-09 PROCEDURE — 99213 OFFICE O/P EST LOW 20 MIN: CPT | Performed by: FAMILY MEDICINE

## 2021-09-09 PROCEDURE — 1036F TOBACCO NON-USER: CPT | Performed by: FAMILY MEDICINE

## 2021-09-09 PROCEDURE — 3725F SCREEN DEPRESSION PERFORMED: CPT | Performed by: FAMILY MEDICINE

## 2021-09-09 RX ORDER — ONDANSETRON 4 MG/1
4 TABLET, ORALLY DISINTEGRATING ORAL EVERY 8 HOURS PRN
Qty: 30 TABLET | Refills: 1 | Status: SHIPPED | OUTPATIENT
Start: 2021-09-09

## 2021-09-09 NOTE — LETTER
September 9, 2021     Patient: Teo Muro  YOB: 1995   Date of Visit: 9/9/2021       To Whom it May Concern:    Laura Felton is under my professional care  He was seen in my office on 9/9/2021  He may return to work on 9/13/2021  If you have any questions or concerns, please don't hesitate to call           Sincerely,          Anup Robbins MD        CC: No Recipients

## 2021-09-10 ENCOUNTER — TELEPHONE (OUTPATIENT)
Dept: FAMILY MEDICINE CLINIC | Facility: CLINIC | Age: 26
End: 2021-09-10

## 2021-09-10 NOTE — PROGRESS NOTES
Virtual Brief Visit    Verification of patient location:    Patient is located in the following state in which I hold an active license [place]      Assessment/Plan:    Problem List Items Addressed This Visit     None      Visit Diagnoses     Viral gastroenteritis    -  Primary    Relevant Medications    ondansetron (ZOFRAN-ODT) 4 mg disintegrating tablet        17-year-old male with:  Viral gastroenteritis discussed Zofran and encouraged ample liquids with electrolytes and advised to call back if not improving worsening        Reason for visit is   Chief Complaint   Patient presents with    Vomiting     Pt got hoagie at [facility] at 5 PM, vomited at 10 PM as well as diarrhea  He has had continual diarrhea since then every 2 hours  Pt has cramping bilaterally in stomach when passing stool  Patient denies blood in stool, fever, or any other sxs   Virtual Brief Visit        Encounter provider Michele Lee MD    Provider located at [address] , [address]  [place]  [address] [id number] [phone]    Recent Visits  Date Type Provider Dept   09/09/21 [facility]phone [facility]   [facility]09[facility]21 [facility]   Showing recent visits within past 7 days and meeting all other requirements  Future Appointments  No visits were found meeting these conditions  Showing future appointments within next 150 days and meeting all other requirements       After connecting through telephone, the patient was identified by name and date of birth  Jeronimo Garcia  was informed that this is a telemedicine visit and that the visit is being conducted through telephone  My office door was closed  No one else was in the room  He acknowledged consent and understanding of privacy and security of the platform   The patient has agreed to participate and understands he can discontinue the visit at any time  Patient is aware this is a billable service  It was my intent to perform this visit via video technology but the patient was not able to do a video connection so the visit was completed via audio telephone only  Andres Armstrong  is a 27 y o  male  Patient is a 78-year-old male who presents for follow-up virtual visit he admits he has had and nausea vomiting diarrhea for the past day or to no fevers or chills tolerating some p o  intake no dizziness lightheadedness or near-syncope       Past Medical History:   Diagnosis Date    Chronic pain disorder     resolved    GERD (gastroesophageal reflux disease)     Low back pain     resolved    Morbid obesity ([address] )        Past Surgical History:   Procedure Laterality Date    ADENOIDECTOMY      MYRINGOTOMY W/ TUBES      two times as a child and teen    [place] ESOPHAGOGASTRODUODENOSCOPY TRANSORAL DIAGNOSTIC N/A 3/21/2019    Procedure: ESOPHAGOGASTRODUODENOSCOPY (EGD) with bx;  Surgeon: Hezzie Cranker, MD;  Location: [facility]; Service: Gastroenterology       Current Outpatient Medications   Medication Sig Dispense Refill    omeprazole (PriLOSEC) 40 MG capsule Take 1 capsule (40 mg total) by mouth 2 (two) times a day before meals 180 capsule 0    esomeprazole (NexIUM) 20 mg capsule Take 20 mg by mouth (Patient not taking: Reported on 9/9/2021)      ofloxacin (OCUFLOX) 0 3 % ophthalmic solution 5 drops left ear two times a day for one week (Patient not taking: Reported on 9/9/2021) 5 mL 0    ondansetron (ZOFRAN-ODT) 4 mg disintegrating tablet Take 1 tablet (4 mg total) by mouth every 8 (eight) hours as needed for nausea or vomiting 30 tablet 1     No current facility-administered medications for this visit  Allergies   Allergen Reactions    Amphetamine-Dextroamphetamine Hallucinations     Other reaction(s): Hallucinations    Latex      Skin peeling    Other        Review of Systems   Constitutional: Negative  HENT: Negative  Eyes: Negative  Respiratory: Negative  Cardiovascular: Negative  Gastrointestinal: Positive for diarrhea, nausea and vomiting  Endocrine: Negative  Genitourinary: Negative  Musculoskeletal: Negative  Allergic/Immunologic: Negative  Neurological: Negative  Hematological: Negative  Psychiatric/Behavioral: Negative  All other systems reviewed and are negative  Vitals:    09/09/21 0959   Temp: 98 3 °F (36 8 °C)   TempSrc: Oral         I spent 10 minutes directly with the patient during this visit    [address]  verbally agrees to participate in [employer]  Pt is aware that [employer] could be limited without vital signs or the ability to perform a full hands-on physical Gordan Perone  understands he or the provider may request at any time to terminate the video visit and request the patient to seek care or treatment in person

## 2021-12-28 ENCOUNTER — TELEPHONE (OUTPATIENT)
Dept: GASTROENTEROLOGY | Facility: CLINIC | Age: 26
End: 2021-12-28

## 2022-01-13 ENCOUNTER — TELEPHONE (OUTPATIENT)
Dept: GASTROENTEROLOGY | Facility: MEDICAL CENTER | Age: 27
End: 2022-01-13

## 2022-01-13 NOTE — TELEPHONE ENCOUNTER
Telephone Encounter            Show:Clear all  [x]Manual[]Template[]Copied    Added by:  Vale Griffin      []Lizzie for details    Called left message needs a updated insurance card so I can do a prior authorization for Omperazole 40mg

## 2022-01-21 ENCOUNTER — TELEPHONE (OUTPATIENT)
Dept: GASTROENTEROLOGY | Facility: MEDICAL CENTER | Age: 27
End: 2022-01-21

## 2023-01-19 ENCOUNTER — TELEPHONE (OUTPATIENT)
Dept: FAMILY MEDICINE CLINIC | Facility: CLINIC | Age: 28
End: 2023-01-19

## 2023-04-07 ENCOUNTER — TELEPHONE (OUTPATIENT)
Dept: FAMILY MEDICINE CLINIC | Facility: CLINIC | Age: 28
End: 2023-04-07

## 2024-02-21 PROBLEM — K52.9 GASTROENTERITIS: Status: RESOLVED | Noted: 2017-08-11 | Resolved: 2024-02-21

## 2024-04-19 ENCOUNTER — OFFICE VISIT (OUTPATIENT)
Dept: FAMILY MEDICINE CLINIC | Facility: CLINIC | Age: 29
End: 2024-04-19
Payer: COMMERCIAL

## 2024-04-19 VITALS
HEIGHT: 74 IN | DIASTOLIC BLOOD PRESSURE: 88 MMHG | OXYGEN SATURATION: 98 % | HEART RATE: 69 BPM | WEIGHT: 315 LBS | SYSTOLIC BLOOD PRESSURE: 120 MMHG | BODY MASS INDEX: 40.43 KG/M2 | TEMPERATURE: 99.1 F

## 2024-04-19 DIAGNOSIS — E66.01 MORBID OBESITY (HCC): ICD-10-CM

## 2024-04-19 DIAGNOSIS — Z00.00 ROUTINE ADULT HEALTH MAINTENANCE: ICD-10-CM

## 2024-04-19 DIAGNOSIS — N50.812 TESTICULAR PAIN, LEFT: Primary | ICD-10-CM

## 2024-04-19 DIAGNOSIS — S33.5XXS SPRAIN OF LOW BACK, SEQUELA: ICD-10-CM

## 2024-04-19 PROCEDURE — 99214 OFFICE O/P EST MOD 30 MIN: CPT | Performed by: FAMILY MEDICINE

## 2024-04-19 PROCEDURE — 99395 PREV VISIT EST AGE 18-39: CPT | Performed by: FAMILY MEDICINE

## 2024-04-19 RX ORDER — KETOROLAC TROMETHAMINE 10 MG/1
10 TABLET, FILM COATED ORAL EVERY 12 HOURS PRN
COMMUNITY
Start: 2024-04-09

## 2024-04-19 RX ORDER — BENZONATATE 200 MG/1
CAPSULE ORAL
COMMUNITY
Start: 2024-04-09

## 2024-04-21 ENCOUNTER — HOSPITAL ENCOUNTER (OUTPATIENT)
Dept: ULTRASOUND IMAGING | Facility: HOSPITAL | Age: 29
Discharge: HOME | End: 2024-04-21
Payer: COMMERCIAL

## 2024-04-21 DIAGNOSIS — N50.812 TESTICULAR PAIN, LEFT: ICD-10-CM

## 2024-04-21 PROCEDURE — 76870 US EXAM SCROTUM: CPT

## 2024-04-23 PROBLEM — E66.01 MORBID OBESITY (HCC): Status: ACTIVE | Noted: 2024-04-23

## 2024-04-23 NOTE — PROGRESS NOTES
Assessment/Plan:    30 y/o male with: testicular pain, low back pain along with morbid obesity along with annual well visit. Will check US. Will refer to Ortho. Discussed supportive care and return parameters.     Regarding Annual well visit. Discussed various safety and health maintenance issues including healthy diet like the Mediterranean diet, exercise, ample sleep, stress reduction, and healthy weight as tolerated. Discussed supportive care and return parameters.     No problem-specific Assessment & Plan notes found for this encounter.       Diagnoses and all orders for this visit:    Testicular pain, left  -     US scrotum and testicles; Future  -     CBC and differential; Future  -     Comprehensive metabolic panel; Future  -     TSH, 3rd generation with Free T4 reflex; Future  -     Lipid Panel with Direct LDL reflex; Future    Routine adult health maintenance  -     CBC and differential; Future  -     Comprehensive metabolic panel; Future  -     TSH, 3rd generation with Free T4 reflex; Future  -     Lipid Panel with Direct LDL reflex; Future    Sprain of low back, sequela  -     Ambulatory Referral to Orthopedic Surgery; Future    Morbid obesity (HCC)    Other orders  -     ketorolac (TORADOL) 10 mg tablet; Take 10 mg by mouth every 12 (twelve) hours as needed (Patient not taking: Reported on 4/19/2024)  -     benzonatate (TESSALON) 200 MG capsule; TAKE 1 CAPSULE 3 TIMES A DAY BY MOUTH AS NEEDED FOR COUGH          Subjective:     Chief Complaint   Patient presents with    Physical Exam     Yearly.  Also c/o pain L testicle. Also was seen in ED for URI with cough        Patient ID: Neo Cabral Jr. is a 29 y.o. male.    Patient is a 30 y/o male who presents for follow-up on testicular pain, low back pain along with morbid obesity. No fevers chills nausea or vomiting. Pt also here for annual well visit admits being active eats and sleeps well.        The following portions of the patient's history were  "reviewed and updated as appropriate: allergies, current medications, past family history, past medical history, past social history, past surgical history and problem list.    Review of Systems   Constitutional: Negative.    HENT: Negative.     Eyes: Negative.    Respiratory: Negative.     Cardiovascular: Negative.    Gastrointestinal: Negative.    Endocrine: Negative.    Genitourinary: Negative.    Musculoskeletal:  Positive for back pain.   Allergic/Immunologic: Negative.    Neurological: Negative.    Hematological: Negative.    Psychiatric/Behavioral: Negative.     All other systems reviewed and are negative.        Objective:      /88 (BP Location: Right arm, Patient Position: Sitting, Cuff Size: Large)   Pulse 69   Temp 99.1 °F (37.3 °C)   Ht 6' 2\" (1.88 m)   Wt (!) 154 kg (340 lb)   SpO2 98%   BMI 43.65 kg/m²          Physical Exam  Constitutional:       Appearance: He is well-developed.   HENT:      Head: Atraumatic.      Right Ear: External ear normal.      Left Ear: External ear normal.   Eyes:      Conjunctiva/sclera: Conjunctivae normal.      Pupils: Pupils are equal, round, and reactive to light.   Cardiovascular:      Rate and Rhythm: Normal rate and regular rhythm.      Heart sounds: Normal heart sounds.   Pulmonary:      Effort: Pulmonary effort is normal. No respiratory distress.      Breath sounds: Normal breath sounds.   Abdominal:      General: There is no distension.      Palpations: Abdomen is soft.      Tenderness: There is no abdominal tenderness. There is no guarding or rebound.   Musculoskeletal:         General: Normal range of motion.      Cervical back: Normal range of motion.   Skin:     General: Skin is warm and dry.   Neurological:      Mental Status: He is alert and oriented to person, place, and time.      Cranial Nerves: No cranial nerve deficit.   Psychiatric:         Behavior: Behavior normal.         Thought Content: Thought content normal.         Judgment: Judgment " normal.           Depression Screening and Follow-up Plan: Patient was screened for depression during today's encounter. They screened negative with a PHQ-2 score of 0.

## 2024-04-26 ENCOUNTER — PATIENT MESSAGE (OUTPATIENT)
Dept: FAMILY MEDICINE CLINIC | Facility: CLINIC | Age: 29
End: 2024-04-26

## 2024-04-26 ENCOUNTER — TELEPHONE (OUTPATIENT)
Age: 29
End: 2024-04-26

## 2024-04-29 NOTE — RESULT ENCOUNTER NOTE
Left message for patient to call back for US results also advised patient of results via [employer].

## 2024-04-29 NOTE — RESULT ENCOUNTER NOTE
Please call patient. Patient has bilateral scrotal skin thickening but US was normal. If he is having redness or continual pain we can refer to urology

## 2024-05-19 PROBLEM — Z00.00 ROUTINE ADULT HEALTH MAINTENANCE: Status: RESOLVED | Noted: 2024-04-19 | Resolved: 2024-05-19

## 2024-06-12 ENCOUNTER — APPOINTMENT (OUTPATIENT)
Dept: RADIOLOGY | Facility: MEDICAL CENTER | Age: 29
End: 2024-06-12
Payer: COMMERCIAL

## 2024-06-12 ENCOUNTER — OFFICE VISIT (OUTPATIENT)
Dept: FAMILY MEDICINE CLINIC | Facility: CLINIC | Age: 29
End: 2024-06-12
Payer: COMMERCIAL

## 2024-06-12 VITALS
HEART RATE: 80 BPM | OXYGEN SATURATION: 99 % | TEMPERATURE: 98 F | DIASTOLIC BLOOD PRESSURE: 78 MMHG | BODY MASS INDEX: 40.43 KG/M2 | SYSTOLIC BLOOD PRESSURE: 135 MMHG | HEIGHT: 74 IN | WEIGHT: 315 LBS

## 2024-06-12 DIAGNOSIS — R07.89 OTHER CHEST PAIN: Primary | ICD-10-CM

## 2024-06-12 DIAGNOSIS — R07.89 OTHER CHEST PAIN: ICD-10-CM

## 2024-06-12 PROCEDURE — 99213 OFFICE O/P EST LOW 20 MIN: CPT | Performed by: FAMILY MEDICINE

## 2024-06-12 PROCEDURE — 71046 X-RAY EXAM CHEST 2 VIEWS: CPT

## 2024-06-12 RX ORDER — PREDNISONE 20 MG/1
40 TABLET ORAL DAILY
Qty: 10 TABLET | Refills: 0 | Status: SHIPPED | OUTPATIENT
Start: 2024-06-12 | End: 2024-06-17

## 2024-06-12 NOTE — LETTER
June 12, 2024     Patient: Neo Cabral Jr.  YOB: 1995  Date of Visit: 6/12/2024      To Whom it May Concern:    Neo Cabral is under my professional care. Neo was seen in my office on 6/12/2024. Neo may return to work on 6/13/2024. Please allow patient to be on light duty for 1 week .    If you have any questions or concerns, please don't hesitate to call.         Sincerely,          Marco Antonio Johnson MD        CC: No Recipients

## 2024-06-13 ENCOUNTER — TELEPHONE (OUTPATIENT)
Age: 29
End: 2024-06-13

## 2024-06-13 ENCOUNTER — DOCUMENTATION (OUTPATIENT)
Dept: FAMILY MEDICINE CLINIC | Facility: CLINIC | Age: 29
End: 2024-06-13

## 2024-06-13 NOTE — TELEPHONE ENCOUNTER
Spoke with patient. He dropped off paperwork to be filled out. He also needs a new letter stating that he can return to work on Tuesday 6/18/2024. It needs to say that he can return with full duty, no restrictions. Please advise.

## 2024-06-13 NOTE — PROGRESS NOTES
The above form was filled out and placed into Dr. Johnson' file folder for his review signature and date on 06/13/24.

## 2024-06-13 NOTE — TELEPHONE ENCOUNTER
Patient called, states he was in [facility]06/12/2024, received Doctor letter of restriction.Patient states his employment, request forms and readjustment to Doctor letter completion to return to work. Contacted practice/clerical, no response. Patient states he will be in as soon as 8 am to drop off forms and collect Doctors letter, Please advise patient at [phone], if any further questions.

## 2024-06-14 ENCOUNTER — TELEPHONE (OUTPATIENT)
Dept: FAMILY MEDICINE CLINIC | Facility: CLINIC | Age: 29
End: 2024-06-14

## 2024-06-14 DIAGNOSIS — G47.00 INSOMNIA, UNSPECIFIED TYPE: Primary | ICD-10-CM

## 2024-06-14 RX ORDER — HYDROXYZINE HYDROCHLORIDE 25 MG/1
25 TABLET, FILM COATED ORAL
Qty: 30 TABLET | Refills: 0 | Status: SHIPPED | OUTPATIENT
Start: 2024-06-14

## 2024-06-14 NOTE — TELEPHONE ENCOUNTER
Patient called, request status of employment forms and Doctors Letter of restrictions, patient states 06/13/2024 he dropped off forms, and expected a callback. Upon chart review/messages, confirmed message regarding the matter. Contacted practice/clerical, Patient warm, Transferred to (Ning)

## 2024-06-14 NOTE — TELEPHONE ENCOUNTER
Pt called stated that the pred is making him jittery and can't sleep.  IS there anything else you can Rx?

## 2024-06-17 NOTE — PROGRESS NOTES
"Assessment/Plan:    28 y/o male with: CP. Will check stress echo and CXR and give steroid burst. Discussed supportive care and return parameters.     No problem-specific Assessment & Plan notes found for this encounter.       Diagnoses and all orders for this visit:    Other chest pain  -     Echo stress test, exercise; Future  -     XR chest pa & lateral; Future  -     predniSONE 20 mg tablet; Take 2 tablets (40 mg total) by mouth daily for 5 days          Subjective:     Chief Complaint   Patient presents with    Hospital Follow-up     Patient was in the ER due to chest pain. Patient states he is still experiencing chest pain (sternum area). Patient states pain scale is a 6. No further concerns, ng        Patient ID: Neo Cabral Jr. is a 29 y.o. male.    Patient is a 28 y/o male who presents for follow-up on CP for several weeks no fevers chills nausea or vomiting.        The following portions of the patient's history were reviewed and updated as appropriate: allergies, current medications, past family history, past medical history, past social history, past surgical history and problem list.    Review of Systems   Constitutional: Negative.    HENT: Negative.     Eyes: Negative.    Respiratory: Negative.     Cardiovascular:  Positive for chest pain.   Gastrointestinal: Negative.    Endocrine: Negative.    Genitourinary: Negative.    Musculoskeletal: Negative.    Allergic/Immunologic: Negative.    Neurological: Negative.    Hematological: Negative.    Psychiatric/Behavioral: Negative.     All other systems reviewed and are negative.        Objective:      /78 (BP Location: Right arm, Patient Position: Sitting, Cuff Size: Large)   Pulse 80   Temp 98 °F (36.7 °C) (Temporal)   Ht 6' 2\" (1.88 m)   Wt (!) 152 kg (335 lb 3.2 oz)   SpO2 99%   BMI 43.04 kg/m²          Physical Exam  Constitutional:       Appearance: He is well-developed.   HENT:      Head: Atraumatic.      Right Ear: External ear normal.      " Left Ear: External ear normal.   Eyes:      Extraocular Movements: EOM normal.      Conjunctiva/sclera: Conjunctivae normal.      Pupils: Pupils are equal, round, and reactive to light.   Cardiovascular:      Rate and Rhythm: Normal rate and regular rhythm.      Heart sounds: Normal heart sounds.   Pulmonary:      Effort: Pulmonary effort is normal. No respiratory distress.      Breath sounds: Normal breath sounds.   Abdominal:      General: There is no distension.      Palpations: Abdomen is soft.      Tenderness: There is no abdominal tenderness. There is no guarding or rebound.   Musculoskeletal:         General: Normal range of motion.      Cervical back: Normal range of motion.   Skin:     General: Skin is warm and dry.   Neurological:      Mental Status: He is alert and oriented to person, place, and time.      Cranial Nerves: No cranial nerve deficit.   Psychiatric:         Mood and Affect: Mood and affect normal.         Behavior: Behavior normal.         Thought Content: Thought content normal.         Judgment: Judgment normal.

## 2024-06-28 ENCOUNTER — HOSPITAL ENCOUNTER (OUTPATIENT)
Dept: NON INVASIVE DIAGNOSTICS | Facility: HOSPITAL | Age: 29
Discharge: HOME | End: 2024-06-28
Payer: COMMERCIAL

## 2024-06-28 VITALS — WEIGHT: 315 LBS | BODY MASS INDEX: 40.43 KG/M2 | HEIGHT: 74 IN

## 2024-06-28 DIAGNOSIS — R07.89 OTHER CHEST PAIN: ICD-10-CM

## 2024-06-28 LAB
AORTIC ROOT: 3.5 CM
ASCENDING AORTA: 3.5 CM
E WAVE DECELERATION TIME: 240 MS
E/A RATIO: 1.08
FRACTIONAL SHORTENING: 28 (ref 28–44)
INTERVENTRICULAR SEPTUM IN DIASTOLE (PARASTERNAL SHORT AXIS VIEW): 1 CM
INTERVENTRICULAR SEPTUM: 1 CM (ref 0.6–1.1)
LEFT ATRIUM SIZE: 3.6 CM
LEFT INTERNAL DIMENSION IN SYSTOLE: 3.9 CM (ref 2.1–4)
LEFT VENTRICULAR INTERNAL DIMENSION IN DIASTOLE: 5.4 CM (ref 3.5–6)
LEFT VENTRICULAR POSTERIOR WALL IN END DIASTOLE: 1.1 CM
LEFT VENTRICULAR STROKE VOLUME: 77 ML
LVSV (TEICH): 77 ML
MAX HR PERCENT: 89 %
MAX HR: 169 BPM
MV E'TISSUE VEL-LAT: 18 CM/S
MV E'TISSUE VEL-SEP: 15 CM/S
MV PEAK A VEL: 0.64 M/S
MV PEAK E VEL: 69 CM/S
MV STENOSIS PRESSURE HALF TIME: 70 MS
MV VALVE AREA P 1/2 METHOD: 3.1
RATE PRESSURE PRODUCT: NORMAL
SL CV LV EF: 52
SL CV PED ECHO LEFT VENTRICLE DIASTOLIC VOLUME (MOD BIPLANE) 2D: 144 ML
SL CV PED ECHO LEFT VENTRICLE SYSTOLIC VOLUME (MOD BIPLANE) 2D: 68 ML
SL CV STRESS RECOVERY BP: NORMAL MMHG
SL CV STRESS RECOVERY HR: 101 BPM
SL CV STRESS RECOVERY O2 SAT: 98 %
SL CV STRESS STAGE REACHED: 3
STRESS ANGINA INDEX: 0
STRESS BASELINE BP: NORMAL MMHG
STRESS BASELINE HR: 69 BPM
STRESS O2 SAT REST: 99 %
STRESS PEAK HR: 169 BPM
STRESS POST ESTIMATED WORKLOAD: 9.3 METS
STRESS POST EXERCISE DUR MIN: 7 MIN
STRESS POST EXERCISE DUR SEC: 30 SEC
STRESS POST O2 SAT PEAK: 98 %
STRESS POST PEAK BP: 174 MMHG
TRICUSPID ANNULAR PLANE SYSTOLIC EXCURSION: 1.9 CM

## 2024-06-28 PROCEDURE — 93350 STRESS TTE ONLY: CPT

## 2024-06-28 PROCEDURE — 93350 STRESS TTE ONLY: CPT | Performed by: INTERNAL MEDICINE

## 2024-06-30 LAB
CHEST PAIN STATEMENT: NORMAL
MAX DIASTOLIC BP: 74 MMHG
MAX PREDICTED HEART RATE: 189 BPM
PROTOCOL NAME: NORMAL
REASON FOR TERMINATION: NORMAL
STRESS POST EXERCISE DUR MIN: 7 MIN
STRESS POST EXERCISE DUR SEC: 30 SEC
STRESS POST PEAK HR: 169 BPM
STRESS POST PEAK SYSTOLIC BP: 174 MMHG
TARGET HR FORMULA: NORMAL
TEST INDICATION: NORMAL

## 2024-11-21 ENCOUNTER — OFFICE VISIT (OUTPATIENT)
Age: 29
End: 2024-11-21
Payer: COMMERCIAL

## 2024-11-21 VITALS
BODY MASS INDEX: 40.43 KG/M2 | HEIGHT: 74 IN | SYSTOLIC BLOOD PRESSURE: 134 MMHG | OXYGEN SATURATION: 98 % | HEART RATE: 78 BPM | WEIGHT: 315 LBS | DIASTOLIC BLOOD PRESSURE: 86 MMHG | TEMPERATURE: 98.1 F

## 2024-11-21 DIAGNOSIS — R06.09 DOE (DYSPNEA ON EXERTION): ICD-10-CM

## 2024-11-21 DIAGNOSIS — R07.89 OTHER CHEST PAIN: ICD-10-CM

## 2024-11-21 DIAGNOSIS — E66.01 MORBID OBESITY (HCC): ICD-10-CM

## 2024-11-21 DIAGNOSIS — H60.93 OTITIS EXTERNA OF BOTH EARS, UNSPECIFIED CHRONICITY, UNSPECIFIED TYPE: Primary | ICD-10-CM

## 2024-11-21 PROBLEM — H60.90 OTITIS EXTERNA: Status: ACTIVE | Noted: 2024-11-21

## 2024-11-21 PROCEDURE — 99214 OFFICE O/P EST MOD 30 MIN: CPT | Performed by: FAMILY MEDICINE

## 2024-11-21 RX ORDER — TIRZEPATIDE 2.5 MG/.5ML
2.5 INJECTION, SOLUTION SUBCUTANEOUS WEEKLY
Qty: 2 ML | Refills: 0 | Status: SHIPPED | OUTPATIENT
Start: 2024-11-21 | End: 2024-12-19

## 2024-11-21 RX ORDER — ALBUTEROL SULFATE 90 UG/1
2 INHALANT RESPIRATORY (INHALATION) EVERY 4 HOURS PRN
Qty: 18 G | Refills: 0 | Status: SHIPPED | OUTPATIENT
Start: 2024-11-21

## 2024-11-21 RX ORDER — OFLOXACIN 3 MG/ML
10 SOLUTION AURICULAR (OTIC) 2 TIMES DAILY
Qty: 30 ML | Refills: 1 | Status: SHIPPED | OUTPATIENT
Start: 2024-11-21

## 2024-11-22 ENCOUNTER — TELEPHONE (OUTPATIENT)
Age: 29
End: 2024-11-22

## 2024-11-22 DIAGNOSIS — R07.89 OTHER CHEST PAIN: Primary | ICD-10-CM

## 2024-11-22 RX ORDER — PREDNISONE 20 MG/1
40 TABLET ORAL DAILY
Qty: 10 TABLET | Refills: 0 | Status: SHIPPED | OUTPATIENT
Start: 2024-11-22 | End: 2024-11-27

## 2024-11-22 NOTE — ASSESSMENT & PLAN NOTE
Discussed supportive care and return parameters. Will give trial of inhaler will refer to cardiology.  Orders:    albuterol (Ventolin HFA) 90 mcg/act inhaler; Inhale 2 puffs every 4 (four) hours as needed for wheezing    Ambulatory Referral to Cardiology; Future

## 2024-11-22 NOTE — ASSESSMENT & PLAN NOTE
Prior Authorization Clinical Questions for Weight Management Pharmacotherapy    2. Does the patient have a diagnosis of obesity, confirmed by a BMI greater than or equal to 30 kg/m^2?: Yes  3. Does the patient have a BMI of greater than or equal to 27 kg/m^2 with at least one weight-related comorbidity/risk factor/complication (e.g. diabetes, dyslipidemia, coronary artery disease)?: Yes  4. Weight-related co-morbidities/risk factors: metabolic syndrome, dyslipidemia  5. Has the patient been on a weight loss regimen of low-calorie diet, increased physical activity, and lifestyle modifications for a minimum of 6 months?: Yes  6. Has the patient completed a comprehensive weight loss program (ie, Weight Watchers, Noom, Bariatrics, other dang on phone)? If so, what?: No  7. Does the patient have a history of type 2 diabetes?: No  8. Has the member tried and failed other weight loss medication within the past 12 months?: No  9. Will the member use requested medication in combination with another GLP agonist or weight loss drug?: Yes  10. Is the medication a controlled substance?: No     Baseline weight (in pounds): 341 lbs       Refer to weight management and begin trial of Zepbound.  Orders:    Ambulatory Referral to Weight Management; Future    tirzepatide (Zepbound) 2.5 mg/0.5 mL auto-injector; Inject 0.5 mL (2.5 mg total) under the skin once a week for 28 days

## 2024-11-22 NOTE — ASSESSMENT & PLAN NOTE
Add topical. Discussed supportive care and return parameters.   Orders:    ofloxacin (FLOXIN) 0.3 % otic solution; Administer 10 drops into both ears 2 (two) times a day    Ambulatory Referral to Otolaryngology; Future

## 2024-11-22 NOTE — PROGRESS NOTES
Name: Neo Cabral Jr.      : 1995      MRN: [id number]  Encounter Provider: Marco Antonio Johnson MD  Encounter Date: 2024   Encounter department: [facility]  :  Assessment & Plan  AGUILAR (dyspnea on exertion)  Discussed supportive care and return parameters. Will give trial of inhaler will refer to cardiology.  Orders:    albuterol (Ventolin HFA) 90 mcg/act inhaler; Inhale 2 puffs every 4 (four) hours as needed for wheezing    Ambulatory Referral to Cardiology; Future    Other chest pain  Discussed supportive care and return parameters. Will refer to cardiology.  Orders:    albuterol (Ventolin HFA) 90 mcg/act inhaler; Inhale 2 puffs every 4 (four) hours as needed for wheezing    Ambulatory Referral to Cardiology; Future    Otitis externa of both ears, unspecified chronicity, unspecified type  Add topical. Discussed supportive care and return parameters.   Orders:    ofloxacin (FLOXIN) 0.3 % otic solution; Administer 10 drops into both ears 2 (two) times a day    Ambulatory Referral to Otolaryngology; Future    Morbid obesity (HCC)  Prior Authorization Clinical Questions for Weight Management Pharmacotherapy    2. Does the patient have a diagnosis of obesity, confirmed by a BMI greater than or equal to 30 kg/m^2?: Yes  3. Does the patient have a BMI of greater than or equal to 27 kg/m^2 with at least one weight-related comorbidity/risk factor/complication (e.g. diabetes, dyslipidemia, coronary artery disease)?: Yes  4. Weight-related co-morbidities/risk factors: metabolic syndrome, dyslipidemia  5. Has the patient been on a weight loss regimen of low-calorie diet, increased physical activity, and lifestyle modifications for a minimum of 6 months?: Yes  6. Has the patient completed a comprehensive weight loss program (ie, Weight Watchers, Noom, Bariatrics, other dang on phone)? If so, what?: No  7. Does the patient have a history of type 2 diabetes?: No  8. Has the member tried and  "failed other weight loss medication within the past 12 months?: No  9. Will the member use requested medication in combination with another GLP agonist or weight loss drug?: Yes  10. Is the medication a controlled substance?: No     Baseline weight (in pounds): 341 lbs       Refer to weight management and begin trial of Zepbound.  Orders:    Ambulatory Referral to Weight Management; Future    tirzepatide (Zepbound) 2.5 mg/0.5 mL auto-injector; Inject 0.5 mL (2.5 mg total) under the skin once a week for 28 days           History of Present Illness     Patient is a 28 y/o male who presents for follow-up on CP, AGUILAR, otitis externa, and morbid obesity no fevers chills nausea or vomiting. Pt also admits a sore under his scrotum.     Muscle Pain  Associated symptoms include chest pain and shortness of breath.     Review of Systems   Constitutional: Negative.    HENT: Negative.     Eyes: Negative.    Respiratory:  Positive for shortness of breath.    Cardiovascular:  Positive for chest pain.   Gastrointestinal: Negative.    Endocrine: Negative.    Genitourinary: Negative.    Musculoskeletal: Negative.    Allergic/Immunologic: Negative.    Neurological: Negative.    Hematological: Negative.    Psychiatric/Behavioral: Negative.     All other systems reviewed and are negative.         Objective   /86 (BP Location: Right arm, Patient Position: Sitting, Cuff Size: Large)   Pulse 78   Temp 98.1 °F (36.7 °C) (Temporal)   Ht 6' 2\" (1.88 m)   Wt (!) 155 kg (341 lb)   SpO2 98%   BMI 43.78 kg/m²      Physical Exam  Vitals reviewed.   Constitutional:       General: He is not in acute distress.     Appearance: He is well-developed. He is not diaphoretic.   HENT:      Head: Normocephalic and atraumatic.      Right Ear: External ear normal.      Left Ear: External ear normal.      Nose: Nose normal.   Eyes:      General: No scleral icterus.        Right eye: No discharge.         Left eye: No discharge.      " Conjunctiva/sclera: Conjunctivae normal.      Pupils: Pupils are equal, round, and reactive to light.   Neck:      Thyroid: No thyromegaly.      Trachea: No tracheal deviation.   Cardiovascular:      Rate and Rhythm: Normal rate and regular rhythm.      Heart sounds: Normal heart sounds. No murmur heard.     No friction rub.   Pulmonary:      Effort: Pulmonary effort is normal. No respiratory distress.      Breath sounds: Normal breath sounds. No stridor. No wheezing or rales.   Abdominal:      General: There is no distension.      Palpations: Abdomen is soft. There is no mass.      Tenderness: There is no abdominal tenderness. There is no guarding or rebound.   Genitourinary:     Comments: Pt defers chaperone. No obvious sore under scrotum visualized.  Musculoskeletal:         General: Normal range of motion.      Cervical back: Normal range of motion and neck supple.   Lymphadenopathy:      Cervical: No cervical adenopathy.   Skin:     General: Skin is warm.   Neurological:      Mental Status: He is alert and oriented to person, place, and time.      Cranial Nerves: No cranial nerve deficit.   Psychiatric:         Behavior: Behavior normal.         Thought Content: Thought content normal.         Judgment: Judgment normal.

## 2024-11-22 NOTE — ASSESSMENT & PLAN NOTE
Discussed supportive care and return parameters. Will refer to cardiology.  Orders:    albuterol (Ventolin HFA) 90 mcg/act inhaler; Inhale 2 puffs every 4 (four) hours as needed for wheezing    Ambulatory Referral to Cardiology; Future

## 2024-11-26 ENCOUNTER — TELEPHONE (OUTPATIENT)
Age: 29
End: 2024-11-26

## 2024-11-26 NOTE — TELEPHONE ENCOUNTER
PA for zepbound 2.5mg SUBMITTED to [facility]    via    []CMM-KEY:    [x]Surescripts-Case ID #  [id number]  []Availity-Auth ID #  NDC #    []Faxed to plan   []Other website    []Phone call Case ID #      []PA sent as URGENT    All office notes, labs and other pertaining documents and studies sent. Clinical questions answered. Awaiting determination from insurance company.     Turnaround time for your insurance to make a decision on your Prior Authorization can take 7-21 business days.

## 2024-11-26 NOTE — TELEPHONE ENCOUNTER
Reason for call:   [x] Prior Auth  [] Other:     Caller:  [x] Patient  [] Pharmacy  Name:   Address:   Callback Number:     Medication: Zepbound 2.5mg    Dose/Frequency: inject 0.5mL under skin once a week for 28 days    Quantity: 2mL    Ordering Provider:   [x] PCP/Provider -   [] Speciality/Provider -     Has the patient tried other medications and failed? If failed, which medications did they fail?    [] No   [] Yes -     Is the patient's insurance updated in EPIC?   [x] Yes   [] No     Is a copy of the patient's insurance scanned in EPIC?   [x] Yes   [] No

## 2024-11-27 NOTE — TELEPHONE ENCOUNTER
PA for Zepbound 2.5mg  APPROVED     Date(s) approved October 27, 2024 to July 24, 2025     Case #[id number]     Patient advised by          [x]MyChart Message  []Phone call   []LMOM  []L/M to call office as no active Communication consent on file  [x]Unable to leave detailed message as VM not approved on Communication consent       Pharmacy advised by    []Fax  []Phone call    Approval letter scanned into [employer] No see below

## 2024-12-05 ENCOUNTER — TELEPHONE (OUTPATIENT)
Dept: CARDIOLOGY CLINIC | Facility: CLINIC | Age: 29
End: 2024-12-05

## 2025-01-16 ENCOUNTER — OFFICE VISIT (OUTPATIENT)
Age: 30
End: 2025-01-16
Payer: COMMERCIAL

## 2025-01-16 VITALS
WEIGHT: 315 LBS | HEIGHT: 74 IN | RESPIRATION RATE: 16 BRPM | SYSTOLIC BLOOD PRESSURE: 142 MMHG | OXYGEN SATURATION: 98 % | HEART RATE: 78 BPM | BODY MASS INDEX: 40.43 KG/M2 | TEMPERATURE: 98.2 F | DIASTOLIC BLOOD PRESSURE: 90 MMHG

## 2025-01-16 DIAGNOSIS — J20.8 ACUTE BRONCHITIS DUE TO OTHER SPECIFIED ORGANISMS: Primary | ICD-10-CM

## 2025-01-16 PROCEDURE — 99213 OFFICE O/P EST LOW 20 MIN: CPT | Performed by: FAMILY MEDICINE

## 2025-01-16 RX ORDER — FLUTICASONE PROPIONATE AND SALMETEROL 250; 50 UG/1; UG/1
1 POWDER RESPIRATORY (INHALATION) 2 TIMES DAILY
Qty: 60 BLISTER | Refills: 1 | Status: SHIPPED | OUTPATIENT
Start: 2025-01-16

## 2025-01-16 RX ORDER — AMOXICILLIN 500 MG/1
500 TABLET, FILM COATED ORAL 3 TIMES DAILY
Qty: 21 TABLET | Refills: 0 | Status: SHIPPED | OUTPATIENT
Start: 2025-01-16 | End: 2025-01-23

## 2025-01-16 NOTE — LETTER
January 21, 2025     Patient: Neo Cabral Jr.  YOB: 1995  Date of Visit: 1/16/2025      To Whom it May Concern:    Neo Cabral is under my professional care. Neo was seen in my office on 1/16/2025. Neo may return to work on 01/17/2025 .    If you have any questions or concerns, please don't hesitate to call.         Sincerely,                  Marco Antonio Johnson MD

## 2025-01-21 NOTE — ASSESSMENT & PLAN NOTE
Discussed supportive care and return parameters. Will add Amoxil and advair.   Orders:    amoxicillin (AMOXIL) 500 MG tablet; Take 1 tablet (500 mg total) by mouth 3 (three) times a day for 7 days    Fluticasone-Salmeterol (Advair Diskus) 250-50 mcg/dose inhaler; Inhale 1 puff 2 (two) times a day Rinse mouth after use.

## 2025-01-21 NOTE — PROGRESS NOTES
"Name: Neo Cabral Jr.      : 1995      MRN: [id number]  Encounter Provider: Marco Antonio Johnson MD  Encounter Date: 2025   Encounter department: [facility] PRIMARY CARE  :  Assessment & Plan  Acute bronchitis due to other specified organisms  Discussed supportive care and return parameters. Will add Amoxil and advair.   Orders:    amoxicillin (AMOXIL) 500 MG tablet; Take 1 tablet (500 mg total) by mouth 3 (three) times a day for 7 days    Fluticasone-Salmeterol (Advair Diskus) 250-50 mcg/dose inhaler; Inhale 1 puff 2 (two) times a day Rinse mouth after use.           History of Present Illness   Patient is a 30 y/o male who presents c/o cough congestion sinus pressure with wheezing no fevers chills nausea or vomiting.      Review of Systems   Constitutional: Negative.    HENT:  Positive for congestion and sinus pressure.    Eyes: Negative.    Respiratory:  Positive for cough and wheezing.    Cardiovascular: Negative.    Gastrointestinal: Negative.    Endocrine: Negative.    Genitourinary: Negative.    Musculoskeletal: Negative.    Allergic/Immunologic: Negative.    Neurological: Negative.    Hematological: Negative.    Psychiatric/Behavioral: Negative.     All other systems reviewed and are negative.      Objective   /90 (BP Location: Left arm, Patient Position: Sitting, Cuff Size: Large)   Pulse 78   Temp 98.2 °F (36.8 °C) (Temporal)   Resp 16   Ht 6' 2\" (1.88 m)   Wt (!) 152 kg (334 lb)   SpO2 98%   BMI 42.88 kg/m²      Physical Exam  Vitals reviewed.   Constitutional:       General: He is not in acute distress.     Appearance: He is well-developed. He is not diaphoretic.   HENT:      Head: Normocephalic and atraumatic.      Right Ear: External ear normal.      Left Ear: External ear normal.      Nose: Nose normal.   Eyes:      General: No scleral icterus.        Right eye: No discharge.         Left eye: No discharge.      Conjunctiva/sclera: Conjunctivae normal.      Pupils: " FOLLOW UP VISIT     November 25, 2020    Patient Name: TRAVIS CAMPO  YOB: 1962  Diagnosis: Malignant neoplasm of unspecified part of unspecified bronchus or lung, C34.90      REASON FOR VISIT: Follow up    CURRENT STATUS:   Patient presents today in follow up and in anticipation of cycle 4 of carboplatin, paclitaxel and pembrolizumab.    He states he feels well and denies adverse effects.  No further episodes of nausea or vomiting. No fevers, chills or infectious symptoms.  He denies any skin rashes or diarrhea.    ECOG PS 1    TREATMENT HISTORY:   1.  9/25/20 C1D1  Carboplatin, paclitaxel, pembrolizumab     NARRATIVE:  The patient is a 58-year-old male with recently diagnosed lung cancer.  Patient was hospitalized at [facility] for shortness of breath found to have right lower lobe lung mass with enlarged hilar lymph nodes.  Also noted to have a pathological rib fracture.  Work-up is as follows    08/06/20 CXR irregular masslike opacity in the medial right lung base measuring up to 7.7 cm.  08/07/20 CT A/P lobulated 7.3 cm mass in the right middle and lower lobe which abuts the left atrium.  1.7cm  lytic lesion in the right posterior 10th rib.  08/08/20 NM Bone scan with increased radiotracer in the posterior right 10th rib corresponding to known lytic lesion.  No evidence for additional osseous metastases  08/10/20 IR guided biopsy of rib lesion pathology notable for squamous cell carcinoma, poorly differentiated.  PDL 1 tumor proportion score: 2%.  Intensity: Moderate.  08/11/20 patient underwent bronchoscopy, EBUS.  Right lower lobe bronchial washings negative for malignancy.   Biopsy of the right lower lobe mass with invasive squamous of carcinoma, grade 3. PDL1 TPS 2%  FNA of subcarinal lymph node negative for malignancy.  FNA of station 10, paratracheal lymph node positive for malignancy.  Squamous cell carcinoma.    PAST MEDICAL HISTORY:  HTN  Asthma  Diabetes   Arthritis    PAST SURGICAL HISTORY:  No  Pupils are equal, round, and reactive to light.   Neck:      Thyroid: No thyromegaly.      Trachea: No tracheal deviation.   Cardiovascular:      Rate and Rhythm: Normal rate and regular rhythm.      Heart sounds: Normal heart sounds. No murmur heard.     No friction rub.   Pulmonary:      Effort: Pulmonary effort is normal. No respiratory distress.      Breath sounds: Normal breath sounds. No stridor. No wheezing or rales.   Abdominal:      General: There is no distension.      Palpations: Abdomen is soft. There is no mass.      Tenderness: There is no abdominal tenderness. There is no guarding or rebound.   Musculoskeletal:         General: Normal range of motion.      Cervical back: Normal range of motion and neck supple.   Lymphadenopathy:      Cervical: No cervical adenopathy.   Skin:     General: Skin is warm.   Neurological:      Mental Status: He is alert and oriented to person, place, and time.      Cranial Nerves: No cranial nerve deficit.   Psychiatric:         Behavior: Behavior normal.         Thought Content: Thought content normal.         Judgment: Judgment normal.          past surgical history    CURRENT MEDICATIONS:  Medications, doses, and frequency reviewed with documentation in the electronic medical record.  Metoprolol, Xarelto, amlodipine, Spiriva, albuterol, Flonase    ALLERGIES: No known drug allergies    FAMILY HISTORY:  Brother with a history of head and neck cancer    SOCIAL HISTORY:   Tobacco: Over 35-pack-year smoking history  Occasional alcohol use.  Reports marijuana use as well  Works in maintenance.    REVIEW OF SYSTEMS  Pertinent positives and negatives as above in current status. Otherwise 10pt ROS are negative.     PHYSICAL EXAMINATION  Today’s vital signs were reviewed in the electronic medical record.  Constitutional: Alert, cooperative.  Mood and affect appropriate. Appears close to chronological age. Well developed. Well nourished.   Head and face: Normocephalic, no scars.   Eyes:  Pupils equal and reactive; conjunctivae clear. Sclerae anicteric.    ENT:  External ear, ear canals normal bilaterally.  External nose and nasal mucosa normal.  Wearing face mask.    Neck:  Supple with full range of motion.    Hematologic/Lymphatic:  No palpable cervical, axillary, or supraclavicular lymphadenopathy. No petechiae or purpura.  Chest: Chest wall is symmetric and without deformities.   Lungs:  Normal respiratory effort.  Lungs bilaterally clear to auscultation.    Cardiovascular:  Rate and rhythm of heart without murmurs, rubs, or gallops.    Abdomen:  Bowel sounds normoactive.  Soft, nontender, nondistended without palpable masses.   Extremities: No calf tenderness. No peripheral edema.   Skin: No lesions suggestive of malignancy.  Neurologic: Cranial nerves II-XII intact.  Normal gait.  No sensory or motor deficits.  Psychiatric: Oriented to time, place, and person.  Coherent speech, Verbalizes understanding of our discussions today.     LABORATORY:  11/24/20  Cr 1.06    4.5>11.3<194  .5    RECENT RADIOLOGY  11/4/20 CT C/A/P  IMPRESSION:  1.   Interval  decrease in size of spiculated right lower lobe mass abutting and distorting the right major fissure, with interval areas of internal necrosis.  2.   Stable appearance of 9 mm subcarinal lymph node which was hypermetabolic on prior PET.  Stable size of 1.4 cm right hilar lymph node. 1.3 cm right hilar lymph node not definitively seen on prior PET or CTA.  3.   Interval increased thickening along the right major fissure measuring 1.7 cm compared to 1.3 cm on PET/CT which may represent atelectasis or fluid along the fissure with neoplastic etiology considered less likely.   4.   Stable lytic bone lesion in the right posterior 10th rib.  5.   No evidence of metastatic disease in the abdomen or pelvis.  6.   Stable size of 4 cm fusiform aneurysm of the infrarenal aorta.  09/21/20 PET/CT  IMPRESSION:  1.  Markedly hypermetabolic right lower lobe spiculated necrotic malignancy, consistent with primary bronchogenic carcinoma.  2.  Enlarged hypermetabolic metastatic subcarinal lymph node.  3.  Lytic destructive hypermetabolic bone metastasis in the right posterior 10th rib.  4.  Bilateral parotid gland hypermetabolic soft tissue nodules. Findings may represent neoplastic/metastatic lymph nodes or bilateral Warthin's tumors. If clinically indicated, findings could be further evaluated with contrast-enhanced CT soft tissue neck. ENT consultation may be considered.  5.  The remainder the study demonstrates no additional suspicious hypermetabolic sites.    09/16/20 MRI Brain  IMPRESSION:  1.  No evidence for acute intracranial abnormality or evidence for intracranial metastatic disease.  2.  Suggestion of enlarged upper cervical chain lymph nodes as well as lesion along the posterior aspect of the right parotid gland, which may reflect an additional enlarged lymph omid or intraparotid lesion.  Further evaluation with dedicated contrast-enhanced CT of the neck soft tissues is advised.    08/08/20 NM Bone scan  IMPRESSION:  1.   Focal increased radiotracer uptake in the posterior right 10th rib corresponding to known lytic lesion on recent CT examination, and most compatible with metastatic disease.  2.  No evidence for additional osseous metastases.    08/07/20 CT Abdomen/pelvis w con    IMPRESSION:  Lobulated 7.3 cm centrally low density mass in the right middle and lower lobes which abuts the left atrium medially without intervening fat plane likely primary bronchogenic neoplasm.   Interval increased small right pleural effusion.  1.7 cm lytic lesion in the right posterior 10th rib is likely a bone metastasis.  4.2 cm fusiform infrarenal abdominal aortic aneurysm.  Mild colonic diverticulosis.      RECENT PATHOLOGY  08/12/20   Pathologic Diagnosis :    A: Medial segment right lower lobe:  - Invasive squamous cell carcinoma, Grade 3. (See comment)  Comment:  PD-L1 immunohistochemistry will be reported separately. Case subjected to  intradepartmental  review    08/11/20  Cytologic Interpretation :    Subcarinal lymph node station 7 FNA:    Statement of Adequacy:       Satisfactory for evaluation.    Descriptive Interpretation:       Negative for malignancy.  Lymphocytes and histiocytes (p40 immunostain is confirmatory).    Station R10 FNA:    Statement of Adequacy:       Satisfactory for evaluation.    Descriptive Interpretation:       Positive for malignancy.  Squamous cell carcinoma with necrotic debris and lymphocytes (immunostains for  LCA and p40 are confirmatory).      Cytologic Interpretation :    Superior segment right lower lobe bronchial washing with cell block:    Statement of Adequacy:       Satisfactory for evaluation.    Descriptive Interpretation:       Negative for malignancy.  Benign bronchial epithelial cells and metaplastic squamous cells.    08/10/20   Pathologic Diagnosis :    A: Right 10th rib; core biopsy:  - Squamous cell carcinoma, poorly-differentiated (see comment).    Diagnosis  Comment:  Immunostain for p40 is diffusely positive while TTF-1 is negative, supporting  the above diagnosis. PD-L1 testing is in progress; results will be reported  when available.     Procedures/Addenda:  Addendum:  Date Ordered:     8/13/2020     Date Reported:8/18/2020    Addendum Diagnosis    Accession: # [id number], BLOCK A2 R lung mass    PD-L1 22C3 (KEYTRUDA) Lung Non-Small Cell Lung Cancer Immunohistochemical  Staining Results:    Expressed    Tumor Proportion Score (TPS):  2%  Intensity:  moderate    Reference Ranges:  High Expression >50% TPS  Expressed 1-49% TPS  No Expression <1% TPS      ASSESSMENT AND PLAN:   The patient is a 58-year-old male with squamous cell carcinoma of the lung.  At previous visit, I reviewed available imaging and pathology reports.  Clinical stage is [id number] stage ABBEY.  Palliative treatment with carboplatin, paclitaxel and pembrolizumab was recommended and cycle 1 day 1 was started 9/25/20.  1. Squamous cell carcinoma of the right lung, stage ABBEY. PDL1 2%. Patient presents today in anticipation of cycle 4 of carboplatin, paclitaxel and pembrolizumab. CT scans completed after C2 on 11/4/20 with favorable response.  There is interval decrease of the spiculated right lower lobe mass, stable appearance of subcarinal, right hilar lymph nodes and stable lytic bone lesion in the right posterior 10th rib.  No evidence of metastatic disease in the abdomen or pelvis.  Labs reviewed and adequate to proceed with cycle 4 today.  Will repeat scans after this cycle.   2. Staging work-up. MRI Brain negative for mets.   3. Access.  Port placed on 9/23/20  4. Symptom management.  Has mild pain but this is adequately controlled at this time  5. Referral placed for palliative care services at initial visit.   6. Weight loss.  Patient reported approximately 10 pound weight loss prior to initiation of therapy.  Weight was 79.6 kg at initiation of therapy. Weight increased to 88.4 kg  today.  7. Tobacco.  Still currently smokes approx 5 cig/day (previously 1ppd). Patient requests nicotine patches. Prescribed today    PLANNED FOLLOW UP:  3-week follow-up in anticipation of maintenance cycle 1    Miri Murcia MD  Hematology/Oncology

## 2025-01-28 ENCOUNTER — OFFICE VISIT (OUTPATIENT)
Age: 30
End: 2025-01-28
Payer: COMMERCIAL

## 2025-01-28 VITALS
DIASTOLIC BLOOD PRESSURE: 80 MMHG | HEIGHT: 73 IN | HEART RATE: 92 BPM | RESPIRATION RATE: 16 BRPM | TEMPERATURE: 98.3 F | OXYGEN SATURATION: 98 % | WEIGHT: 315 LBS | SYSTOLIC BLOOD PRESSURE: 118 MMHG | BODY MASS INDEX: 41.75 KG/M2

## 2025-01-28 DIAGNOSIS — R10.9 BILATERAL FLANK PAIN: Primary | ICD-10-CM

## 2025-01-28 PROCEDURE — 99213 OFFICE O/P EST LOW 20 MIN: CPT | Performed by: FAMILY MEDICINE

## 2025-01-28 RX ORDER — METHOCARBAMOL 500 MG/1
500 TABLET, FILM COATED ORAL 3 TIMES DAILY PRN
Qty: 30 TABLET | Refills: 0 | Status: SHIPPED | OUTPATIENT
Start: 2025-01-28

## 2025-01-28 NOTE — LETTER
January 28, 2025     Patient: Neo Cabral Jr.  YOB: 1995  Date of Visit: 1/28/2025      To Whom it May Concern:    Neo Cabral is under my professional care. Neo was seen in my office on 1/28/2025. Neo may return to work on 2/3/2025 .    If you have any questions or concerns, please don't hesitate to call.         Sincerely,          Marco Antonio Johnson MD        CC: No Recipients

## 2025-01-30 ENCOUNTER — HOSPITAL ENCOUNTER (OUTPATIENT)
Dept: ULTRASOUND IMAGING | Facility: HOSPITAL | Age: 30
Discharge: HOME | End: 2025-01-30
Payer: COMMERCIAL

## 2025-01-30 DIAGNOSIS — R10.9 BILATERAL FLANK PAIN: ICD-10-CM

## 2025-01-30 PROCEDURE — 76775 US EXAM ABDO BACK WALL LIM: CPT

## 2025-01-30 NOTE — PROGRESS NOTES
"Name: Neo Cabral Jr.      : 1995      MRN: [id number]  Encounter Provider: Marco Antonio Johnson MD  Encounter Date: 2025   Encounter department: [facility] PRIMARY CARE  :  Assessment & Plan  Bilateral flank pain  Discussed workup and treatment options. Will check US as a precaution, will give muscle relaxer PRN and refer to PT. Discussed supportive care and return parameters.   Orders:    US kidney and bladder; Future    methocarbamol (ROBAXIN) 500 mg tablet; Take 1 tablet (500 mg total) by mouth 3 (three) times a day as needed for muscle spasms    Ambulatory Referral to Physical Therapy; Future           History of Present Illness   Patient is a 28 y/o male who presents c/o bilateral intermittent flank pain no fevers chills nausea or vomiting.    Back Pain      Review of Systems   Constitutional: Negative.    HENT: Negative.     Eyes: Negative.    Respiratory: Negative.     Cardiovascular: Negative.    Gastrointestinal: Negative.    Endocrine: Negative.    Genitourinary:  Positive for flank pain.   Musculoskeletal:  Positive for back pain.   Allergic/Immunologic: Negative.    Neurological: Negative.    Hematological: Negative.    Psychiatric/Behavioral: Negative.     All other systems reviewed and are negative.      Objective   /80 (BP Location: Left arm, Patient Position: Sitting, Cuff Size: Large)   Pulse 92   Temp 98.3 °F (36.8 °C) (Temporal)   Resp 16   Ht 6' 1\" (1.854 m)   Wt (!) 153 kg (338 lb)   SpO2 98%   BMI 44.59 kg/m²      Physical Exam  Vitals reviewed.   Constitutional:       General: He is not in acute distress.     Appearance: He is well-developed. He is not diaphoretic.   HENT:      Head: Normocephalic and atraumatic.      Right Ear: External ear normal.      Left Ear: External ear normal.      Nose: Nose normal.   Eyes:      General: No scleral icterus.        Right eye: No discharge.         Left eye: No discharge.      Conjunctiva/sclera: Conjunctivae " normal.      Pupils: Pupils are equal, round, and reactive to light.   Neck:      Thyroid: No thyromegaly.      Trachea: No tracheal deviation.   Cardiovascular:      Rate and Rhythm: Normal rate and regular rhythm.      Heart sounds: Normal heart sounds. No murmur heard.     No friction rub.   Pulmonary:      Effort: Pulmonary effort is normal. No respiratory distress.      Breath sounds: Normal breath sounds. No stridor. No wheezing or rales.   Abdominal:      General: There is no distension.      Palpations: Abdomen is soft. There is no mass.      Tenderness: There is no abdominal tenderness. There is no guarding or rebound.   Musculoskeletal:         General: Tenderness present.      Cervical back: Normal range of motion and neck supple.   Lymphadenopathy:      Cervical: No cervical adenopathy.   Skin:     General: Skin is warm.   Neurological:      Mental Status: He is alert and oriented to person, place, and time.      Cranial Nerves: No cranial nerve deficit.   Psychiatric:         Behavior: Behavior normal.         Thought Content: Thought content normal.         Judgment: Judgment normal.

## 2025-01-31 ENCOUNTER — TELEPHONE (OUTPATIENT)
Age: 30
End: 2025-01-31

## 2025-01-31 DIAGNOSIS — R10.9 BILATERAL FLANK PAIN: Primary | ICD-10-CM

## 2025-01-31 RX ORDER — LIDOCAINE 4 G/G
1 PATCH TOPICAL DAILY PRN
Qty: 30 PATCH | Refills: 1 | Status: SHIPPED | OUTPATIENT
Start: 2025-01-31

## 2025-01-31 NOTE — TELEPHONE ENCOUNTER
Pt is still experiencing back pain. He reports that the otc tylenol does not help. The muscle relaxer helps a little. Pt wondering if the US can be rushed at all. Pt is due to go back to work on Monday but he cannot work and take muscle relaxers as that would be dangerous in his line of work. Please advise if there is anything else that can be given

## 2025-02-06 ENCOUNTER — TELEPHONE (OUTPATIENT)
Age: 30
End: 2025-02-06

## 2025-02-07 ENCOUNTER — RESULTS FOLLOW-UP (OUTPATIENT)
Age: 30
End: 2025-02-07

## 2025-02-15 PROBLEM — J20.8 ACUTE BRONCHITIS DUE TO OTHER SPECIFIED ORGANISMS: Status: RESOLVED | Noted: 2025-01-16 | Resolved: 2025-02-15

## 2025-03-19 ENCOUNTER — OFFICE VISIT (OUTPATIENT)
Age: 30
End: 2025-03-19
Payer: COMMERCIAL

## 2025-03-19 ENCOUNTER — TELEPHONE (OUTPATIENT)
Age: 30
End: 2025-03-19

## 2025-03-19 VITALS
HEART RATE: 79 BPM | DIASTOLIC BLOOD PRESSURE: 80 MMHG | HEIGHT: 74 IN | OXYGEN SATURATION: 98 % | BODY MASS INDEX: 40.43 KG/M2 | TEMPERATURE: 97.8 F | SYSTOLIC BLOOD PRESSURE: 120 MMHG | RESPIRATION RATE: 18 BRPM | WEIGHT: 315 LBS

## 2025-03-19 DIAGNOSIS — M54.12 CERVICAL RADICULOPATHY: Primary | ICD-10-CM

## 2025-03-19 PROCEDURE — 99213 OFFICE O/P EST LOW 20 MIN: CPT | Performed by: FAMILY MEDICINE

## 2025-03-19 RX ORDER — METHYLPREDNISOLONE 4 MG
TABLET, DOSE PACK ORAL
COMMUNITY
Start: 2025-03-18 | End: 2025-03-24

## 2025-03-19 NOTE — TELEPHONE ENCOUNTER
Patient dropped off paperwork to be filled out for employer, placed in Aileen Mccollum's bin for completion

## 2025-03-20 ENCOUNTER — DOCUMENTATION (OUTPATIENT)
Age: 30
End: 2025-03-20

## 2025-03-20 NOTE — PROGRESS NOTES
"Left a Vm today to state until his recent visit note is completed by his doctor, I cannot complete his FMLA Form.  I then proceeded to talk with Dr. Johnson about his patient and he stated , the patient was given two sets of FMLA forms, one for PCP and the other for his specialist (Chiropractor)?    Dr. Johnson is waiting for a response from the patient as to whether he is still being put on light duty on a continuous leave from his specials. The form will stay in my \"Work In Progress Folder\" until I try to contact patient on Tuesday 03/25/25.  "

## 2025-03-21 NOTE — ASSESSMENT & PLAN NOTE
Discussed heat and cold, stretching and NSAIDs and refer to PT. Discussed supportive care and return parameters.   Orders:    Ambulatory Referral to Physical Therapy; Future

## 2025-03-21 NOTE — PROGRESS NOTES
"Name: Neo Cabral Jr.      : 1995      MRN: [id number]  Encounter Provider: Marco Antonio Johnson MD  Encounter Date: 3/19/2025   Encounter department: [facility] PRIMARY CARE  :  Assessment & Plan  Cervical radiculopathy  Discussed heat and cold, stretching and NSAIDs and refer to PT. Discussed supportive care and return parameters.   Orders:    Ambulatory Referral to Physical Therapy; Future           History of Present Illness   Patient is a 28 y/o male who presents c/o neck injury with stiffness and some radiating into left shoulder no fevers chills nausea or vomiting.       Review of Systems   Constitutional: Negative.    HENT: Negative.     Eyes: Negative.    Respiratory: Negative.     Cardiovascular: Negative.    Gastrointestinal: Negative.    Endocrine: Negative.    Genitourinary: Negative.    Musculoskeletal:  Positive for arthralgias, myalgias and neck pain.   Allergic/Immunologic: Negative.    Neurological: Negative.    Hematological: Negative.    Psychiatric/Behavioral: Negative.     All other systems reviewed and are negative.      Objective   /80   Pulse 79   Temp 97.8 °F (36.6 °C)   Resp 18   Ht 6' 2\" (1.88 m)   Wt (!) 152 kg (334 lb)   SpO2 98%   BMI 42.88 kg/m²      Physical Exam  Vitals reviewed.   Constitutional:       General: He is not in acute distress.     Appearance: He is well-developed. He is not diaphoretic.   HENT:      Head: Normocephalic and atraumatic.      Right Ear: External ear normal.      Left Ear: External ear normal.      Nose: Nose normal.   Eyes:      General: No scleral icterus.        Right eye: No discharge.         Left eye: No discharge.      Conjunctiva/sclera: Conjunctivae normal.      Pupils: Pupils are equal, round, and reactive to light.   Neck:      Thyroid: No thyromegaly.      Trachea: No tracheal deviation.   Cardiovascular:      Rate and Rhythm: Normal rate and regular rhythm.      Heart sounds: Normal heart sounds. No murmur " heard.     No friction rub.   Pulmonary:      Effort: Pulmonary effort is normal. No respiratory distress.      Breath sounds: Normal breath sounds. No stridor. No wheezing or rales.   Abdominal:      General: There is no distension.      Palpations: Abdomen is soft. There is no mass.      Tenderness: There is no abdominal tenderness. There is no guarding or rebound.   Musculoskeletal:         General: Tenderness present.      Cervical back: Normal range of motion and neck supple.   Lymphadenopathy:      Cervical: No cervical adenopathy.   Skin:     General: Skin is warm.   Neurological:      Mental Status: He is alert and oriented to person, place, and time.      Cranial Nerves: No cranial nerve deficit.   Psychiatric:         Behavior: Behavior normal.         Thought Content: Thought content normal.         Judgment: Judgment normal.

## 2025-03-25 NOTE — TELEPHONE ENCOUNTER
Pt called in to check on update of paperwork he dropped off. Yogi transferred pt to Niurka in the practice.

## 2025-03-27 ENCOUNTER — EVALUATION (OUTPATIENT)
Dept: PHYSICAL THERAPY | Facility: CLINIC | Age: 30
End: 2025-03-27
Payer: COMMERCIAL

## 2025-03-27 DIAGNOSIS — M54.12 CERVICAL RADICULOPATHY: Primary | ICD-10-CM

## 2025-03-27 PROCEDURE — 97110 THERAPEUTIC EXERCISES: CPT

## 2025-03-27 PROCEDURE — 97161 PT EVAL LOW COMPLEX 20 MIN: CPT

## 2025-03-27 NOTE — PROGRESS NOTES
PT Evaluation     Today's date: 3/27/2025  Patient name: Neo Cabral Jr.  : 1995  MRN: [id number]  Referring provider: Marco Antonio Johnson MD  Dx:   Encounter Diagnosis     ICD-10-CM    1. Cervical radiculopathy  M54.12 Ambulatory Referral to Physical Therapy                   Assessment  Impairments: abnormal or restricted ROM, activity intolerance, impaired physical strength, lacks appropriate home exercise program, pain with function, scapular dyskinesis, poor posture , poor body mechanics, participation limitations and activity limitations  Symptom irritability: low    Assessment details: Pt is a 29 y.o. year old male that presents to outpatient physical therapy with Cervical radiculopathy. Pt demonstrates signs and symptoms that point to confirm primary encounter diagnosis that follows the C5-C6 nerve root. (+) radial nerve tension test on the L, decreased myotomal testing at the C5-C6 levels on the L. Pt demonstrates increased tone of upper traps, levator scaps, and suboccipitals, with decreased strength of deep neck flexors and scapular retractors. Pt demonstrates increased pain, decreased ROM, decreased strength, decreased activity tolerance, and decreased functional activity due to pain. Pt appears motivated; HEP was reviewed and given to patient. Pt would benefit from skilled physical therapy to address noted impairments, meet patient's goals, and to return to [facility].   Thank you for this referral.    Understanding of Dx/Px/POC: good     Prognosis: good    Goals  STGs:   1. Pt will be able to demonstrate an increase of strength by at least 1/2 grade within 4 weeks   2. Pt will be able to achieve increased ROM by at least 25% within 4 weeks.   3. Pt will be able to report pain less than 4/10 at worse within 4 weeks.   4. Pt will be able to demonstrate improved deep neck flexor endurance by at least 10 seconds within 4 weeks   5. Pt will be able to report no radicular symptoms below glenohumeral region  "within 4 weeks.  6. Pt will be able to demonstrate improve cervical and thoracic PA joint mobility to WNL without symptoms within 4 weeks.     LTGs:   1. Pt will be independent with all IADLs without pain or discomfort upon discharge.   2. Pt will be independent with HEP upon discharge   3. Pt will be able to report no pain or discomfort with all work duties and recreational activities for a full day upon discharge.  4. Pt will be able to report 'no difficulty' with heavy household activities such as cleaning or lifting at least 25 lbs to waist upon discharge       Plan  Patient would benefit from: PT eval and skilled physical therapy  Planned modality interventions: low level laser therapy, cryotherapy, electrical stimulation/Russian stimulation, iontophoresis, traction, ultrasound, unattended electrical stimulation, TENS and thermotherapy: hydrocollator packs    Planned therapy interventions: abdominal trunk stabilization, joint mobilization, manual therapy, massage, muscle pump exercises, neuromuscular re-education, patient education, postural training, strengthening, stretching, therapeutic activities, therapeutic exercise, therapeutic training, home exercise program, functional ROM exercises, flexibility, breathing training, body mechanics training, behavior modification, balance, activity modification, IADL retraining, nerve gliding and patient/caregiver education    Frequency: 2x week  Duration in weeks: 8  Treatment plan discussed with: patient and PTA        Subjective Evaluation    History of Present Illness  Date of onset: 3/18/2025  Mechanism of injury: Pt states that he has been having pain/discomfort for the past few days. Reports that he went to the ED a few day ago. At the time, He states he \"feels like his chest pain radiates to his left arm and left side of his neck with associated left arm paresthesias.\" He noted some mild lightheadedness, but denied shortness of breath or difficulty breathing. No " headache or cervical pain. He stated he has history of chronic nausea but nothing above his baseline. No vomiting, no syncope, no palpitations. He denied pain or swelling in the legs.  Was given medication which was been helpful overall, but still is experiencing neck stiffness and pain and numbness/tingling in his L shoulder/upper arm    Social involvement: Shahzad Abdalla    VBI: (-) Diplopia, (-) Dizziness, (-) Dysphagia, (-) Dysarthria, (-) Drop attacks, (-) Nausea, (-) Vomiting, (-) Sensory changes, (-) Nystagmus       AGGS: looking to the R, sleeping on R side  EASES: rest, medication, avoiding aggs  Goals: decrease pain, get back to work      Imaging findings: Cervical Xray on 3/18/2025 Impressions:     - Straightening of the cervical lordosis may be secondary to muscle spasm.    - Questionable mild disc space narrowing C5-C6. If there is no contraindication, further evaluation with MRI may be of use.   Patient Goals  Patient goals for therapy: decreased pain, increased motion and return to work    Pain  Current pain rating: 3  At best pain ratin  At worst pain ratin  Quality: dull ache, radiating, tight and discomfort    Social Support  Lives with: young children    Employment status: working  Hand dominance: right    Treatments  Current treatment: medication        Objective     Palpation   Left   Hypertonic in the suboccipitals and upper trapezius.     Right   Hypertonic in the suboccipitals and upper trapezius.   Tenderness of the levator scapulae and upper trapezius.     Neurological Testing     Sensation   Cervical/Thoracic   Left   Intact: light touch    Right   Intact: light touch    Active Range of Motion   Cervical/Thoracic Spine       Cervical    Flexion:  WFL  Extension:  with pain Restriction level: moderate  Left lateral flexion: 22 degrees      Right lateral flexion: 25 degrees      Left rotation: 45 degrees  Right rotation: 70 degrees       Joint Play   Joints within functional limits: T2  "and T3     Hypomobile: C3, C4, C5, C6, C7, T1 and 1st rib     Strength/Myotome Testing     Left Shoulder     Planes of Motion   Flexion: 4+   Extension: 4+   Abduction: 4-   External rotation at 0°: 4+     Left Elbow   Flexion: 4-    Tests   Cervical   Positive neck flexor muscle endurance test.  Negative vertical compression and cervical distraction.     Left   Negative Spurling's Test A.     Right   Negative Spurling's Test A.     Left Shoulder   Positive ULTT3.   Negative ULTT1 and ULTT4.     Right Shoulder   Negative ULTT1, ULTT3 and ULTT4.     Lumbar   Negative vertical compression.            Precautions: GERD    Daily Treatment Diary    Date 3/27            FOTO IE -             Re-Eval IE               Manuals    Cervical distraction             UT, LS, SOR STM                                       Neuro Re-Ed                  Radial nerve glide 3x10            Cervical retraction 2x10                                                                 Ther Ex    Arm bike - ROM             UT stretch 30\" hold 3x            Scapular retraction             Cervical retraction with extension (towel) 2x10            Standing shoulder ext and row with TB NV            Seated thoracic extensions                                                                              Ther Activity                              Gait Training                              Modalities    Ice vs HP PRN                                   "

## 2025-04-02 ENCOUNTER — OFFICE VISIT (OUTPATIENT)
Dept: PHYSICAL THERAPY | Facility: CLINIC | Age: 30
End: 2025-04-02
Payer: COMMERCIAL

## 2025-04-02 DIAGNOSIS — M54.12 CERVICAL RADICULOPATHY: Primary | ICD-10-CM

## 2025-04-02 PROCEDURE — 97140 MANUAL THERAPY 1/> REGIONS: CPT

## 2025-04-02 PROCEDURE — 97110 THERAPEUTIC EXERCISES: CPT

## 2025-04-02 NOTE — PROGRESS NOTES
"Daily Note     Today's date: 2025  Patient name: Neo Cabral Jr.  : 1995  MRN: [id number]  Referring provider: Marco Antonio Johnson MD  Dx:   Encounter Diagnosis     ICD-10-CM    1. Cervical radiculopathy  M54.12                      Subjective: Pt states that he has noticed a little bit less symptoms of numbness/tingling in his arm, but is noticing a little more soreness in his neck.       Objective: See treatment diary below      Assessment: Tolerated treatment well. Reviewed HEP with patient for form. Manual techniques were performed to increase cerivcal ROM and mobility and to increase foraminal space with manual traction. Activities were also performed to address upper cross type signs and symptoms. Additional thoracic extension mobility activities were also performed to address hypomobility. Patient demonstrated fatigue post treatment and would benefit from continued PT      Plan: Continue per plan of care.      Precautions: GERD    Daily Treatment Diary    Date 3/27 4/2           FOTO IE -             Re-Eval IE               Manuals    Cervical distraction  JM - grade 1           UT, LS, SOR STM  JM - UT stretch                                     Neuro Re-Ed                  Radial nerve glide 3x10 2x10 (L)           Cervical retraction 2x10  Supine today 2x10     With head lift 3\" hold 10x                                                               Ther Ex    Arm bike - ROM  2 min fwd and 2 mins bkw           UT stretch 30\" hold 3x            Scapular retraction  Green TB 3x10            Cervical retraction with extension (towel) 2x10            Standing shoulder ext and row at [place] Ext 16.0 3x10    Row 20.0 3x10            Seated thoracic extensions  With blue foam 20x           Wall 'angels'  15x                                                               Ther Activity                              Gait Training                              Modalities    Ice vs HP PRN                    "

## 2025-04-07 ENCOUNTER — APPOINTMENT (OUTPATIENT)
Dept: PHYSICAL THERAPY | Facility: CLINIC | Age: 30
End: 2025-04-07
Payer: COMMERCIAL

## 2025-04-10 ENCOUNTER — OFFICE VISIT (OUTPATIENT)
Dept: PHYSICAL THERAPY | Facility: CLINIC | Age: 30
End: 2025-04-10
Payer: COMMERCIAL

## 2025-04-10 ENCOUNTER — DOCUMENTATION (OUTPATIENT)
Age: 30
End: 2025-04-10

## 2025-04-10 DIAGNOSIS — M54.12 CERVICAL RADICULOPATHY: Primary | ICD-10-CM

## 2025-04-10 PROCEDURE — 97112 NEUROMUSCULAR REEDUCATION: CPT

## 2025-04-10 PROCEDURE — 97110 THERAPEUTIC EXERCISES: CPT

## 2025-04-10 PROCEDURE — 97140 MANUAL THERAPY 1/> REGIONS: CPT

## 2025-04-10 NOTE — PROGRESS NOTES
"Daily Note     Today's date: 4/10/2025  Patient name: Neo Cabral Jr.  : 1995  MRN: [id number]  Referring provider: Marco Antonio Johnson MD  Dx:   Encounter Diagnosis     ICD-10-CM    1. Cervical radiculopathy  M54.12                        Subjective: Patient continues to see less N/T down L UE but more stiffness and pain in neck.      Objective: See treatment diary below.      Assessment: Educated on process of centralization as patient is experiencing less distal symptoms and more proximal. Manual interventions continue to address decreased cervical mobility and presence of soft tissue restrictions, noting less stiffness following. Presence of DNF > postural weakness with inability to hold chin tuck with lift > 5 seconds without loss of form. No peripheralization of symptoms during session. Noted fatigue post session. Encouraged continued HEP compliance. Will continue to benefit from skilled PT with positive progress seen thus far, helping to decrease pain and improve overall function.      Plan: Continue per plan of care.          Precautions: GERD    Daily Treatment Diary    Date 3/27 4/2 4/10          FOTO IE -             Re-Eval IE               Manuals    Cervical distraction  JM - grade 1 EH          UT, LS, SOR STM  JM - UT stretch And UT stretch -    EH                                    Neuro Re-Ed                  Radial nerve glide 3x10 2x10 (L) L -  2x10          Cervical retraction 2x10  Supine today 2x10     With head lift 3\" hold 10x Supine  2x10    With head lift  3\"x10                                                              Ther Ex    Arm bike - ROM  2 min fwd and 2 mins bkw 2 min ea  L1          UT stretch 30\" hold 3x            Scapular retraction  Green TB 3x10  Green  3x10          Cervical retraction with extension (towel) 2x10            Standing shoulder ext and row at [place] Ext 16.0 3x10    Row 20.0 3x10  Ext  16.0  3x10    Row  20.0  3x10          Seated thoracic " extensions  With blue foam 20x With blue foam roller    x20          Wall 'angels'  15x x15                                                              Ther Activity                              Gait Training                              Modalities    Ice vs HP PRN

## 2025-04-10 NOTE — PROGRESS NOTES
I left a Voice Mail message today for the above clarification of re-turn to work date either 044/5/25 or 05/25/25 awaiting return call of answer.

## 2025-04-14 ENCOUNTER — OFFICE VISIT (OUTPATIENT)
Dept: PHYSICAL THERAPY | Facility: CLINIC | Age: 30
End: 2025-04-14
Payer: COMMERCIAL

## 2025-04-14 DIAGNOSIS — M54.12 CERVICAL RADICULOPATHY: Primary | ICD-10-CM

## 2025-04-14 PROCEDURE — 97140 MANUAL THERAPY 1/> REGIONS: CPT

## 2025-04-14 PROCEDURE — 97110 THERAPEUTIC EXERCISES: CPT

## 2025-04-14 NOTE — PROGRESS NOTES
"Daily Note     Today's date: 2025  Patient name: Neo Cabral Jr.  : 1995  MRN: [id number]  Referring provider: Marco Antonio Johnson MD  Dx:   Encounter Diagnosis     ICD-10-CM    1. Cervical radiculopathy  M54.12                      Subjective: Pt states that he is doing well today. Reports that he did have a little bit of numbness in his UE today when doing the nerve stretch, but resolved quickly after stopping.       Objective: See treatment diary below      Assessment: Tolerated treatment well. Additional postural mobility and strengthening activities were added today to address upper cross like signs and symptoms and to improve neural mobility. Denied increase of pain or radicular symptoms today. Education was given on DOMS following the increase of physical exercise. Patient demonstrated fatigue post treatment and would benefit from continued PT      Plan: Continue per plan of care.      Precautions: GERD    Daily Treatment Diary    Date 3/27 4/2 4/10 4/14         FOTO IE -             Re-Eval IE               Manuals    Cervical distraction  JM - grade 1 EH JM - grade 1         UT, LS, SOR STM  JM - UT stretch And UT stretch -    EH                                    Neuro Re-Ed                  Radial nerve glide 3x10 2x10 (L) L -  2x10          Cervical retraction 2x10  Supine today 2x10     With head lift 3\" hold 10x Supine  2x10    With head lift  3\"x10 Supine  2x10    With head lift  5\"x10                                                             Ther Ex    Arm bike - ROM  2 min fwd and 2 mins bkw 2 min ea  L1 2 min fwd and 2 mins bkw         UT stretch 30\" hold 3x   30\" hold 3x         Scapular retraction  Green TB 3x10  Green  3x10          Cervical retraction with extension (towel) 2x10            Supine pec progression (3 motions)    Abd, flex/ext, angels 1 min ea         SA roll ups with TB    OTB 2x8         Standing shoulder ext and row at [place] Ext 16.0 3x10    Row 20.0 3x10  " Ext  16.0  3x10    Row  20.0  3x10 Ext with black TB 15x    Ext  23.0  3x10    Row  22.0  3x10         Seated thoracic extensions  With blue foam 20x With blue foam roller    x20 With blue foam roller    x20         Wall 'angels'  15x x15          Wall clock     OTB 6 points 7x ea                                                Ther Activity                              Gait Training                              Modalities    Ice vs HP PRN

## 2025-04-17 ENCOUNTER — OFFICE VISIT (OUTPATIENT)
Dept: PHYSICAL THERAPY | Facility: CLINIC | Age: 30
End: 2025-04-17
Payer: COMMERCIAL

## 2025-04-17 DIAGNOSIS — M54.12 CERVICAL RADICULOPATHY: Primary | ICD-10-CM

## 2025-04-17 PROCEDURE — 97112 NEUROMUSCULAR REEDUCATION: CPT

## 2025-04-17 PROCEDURE — 97140 MANUAL THERAPY 1/> REGIONS: CPT

## 2025-04-17 PROCEDURE — 97110 THERAPEUTIC EXERCISES: CPT

## 2025-04-17 NOTE — PROGRESS NOTES
"Daily Note     Today's date: 2025  Patient name: Neo Cabral Jr.  : 1995  MRN: [id number]  Referring provider: Marco Antonio Johnson MD  Dx:   Encounter Diagnosis     ICD-10-CM    1. Cervical radiculopathy  M54.12                      Subjective: Patient does feel that his strength is getting better overall.      Objective: See treatment diary below.      Assessment: Theragun utilized to help with addressing residual UT/LS tightness bilaterally. No reported radicular symptoms noted throughout session. Increase in UE fatigue as session progressed with postural > DNF strengthening. Skilled PT has proved to be beneficial for patient as radicular symptoms have diminished. Will continue to focus on postural education, strengthening of DNF and scapular musculature, and improving cervical and thoracic mobility to reduce stress on cervical spine and prevent return of radicular symptoms.      Plan: Continue per plan of care.          Precautions: GERD    Daily Treatment Diary    Date 3/27 4/2 4/10 4/14 4/17        FOTO IE -             Re-Eval IE               Manuals    Cervical distraction  JM - grade 1 EH JM - grade 1 EH        UT, LS, SOR STM  JM - UT stretch And UT stretch -    EH  Theragun 1750 to UT/LS    - EH                                  Neuro Re-Ed                  Radial nerve glide 3x10 2x10 (L) L -  2x10          Cervical retraction 2x10  Supine today 2x10     With head lift 3\" hold 10x Supine  2x10    With head lift  3\"x10 Supine  2x10    With head lift  5\"x10 Supine  2x10    With head lift  5\"x10                                                            Ther Ex    Arm bike - ROM  2 min fwd and 2 mins bkw 2 min ea  L1 2 min fwd and 2 mins bkw 2 min ea  L1        UT stretch 30\" hold 3x   30\" hold 3x 30\"x3  bilat        Scapular retraction  Green TB 3x10  Green  3x10          Cervical retraction with extension (towel) 2x10            Supine pec progression   (3 motions)    Abd, flex/ext, angels 1 " min ea Pec stretch, hugs, angels     1 min ea        SA roll ups with TB    OTB 2x8 OTB  2x10        Standing shoulder ext and row at [place] Ext 16.0 3x10    Row 20.0 3x10  Ext  16.0  3x10    Row  20.0  3x10 Ext with black TB 15x    Ext  23.0  3x10    Row  22.0  3x10 Ext  23.0  3x10    Row  22.5  3x10        Seated thoracic extensions  With blue foam 20x With blue foam roller    x20 With blue foam roller    x20 With blue foam roller    x20        Wall 'angels'  15x x15          Wall clock     OTB 6 points 7x ea OTB    6 pointsx7 ea                                               Ther Activity                              Gait Training                              Modalities    Ice vs HP PRN

## 2025-04-21 ENCOUNTER — APPOINTMENT (OUTPATIENT)
Dept: PHYSICAL THERAPY | Facility: CLINIC | Age: 30
End: 2025-04-21
Payer: COMMERCIAL

## 2025-04-23 ENCOUNTER — APPOINTMENT (OUTPATIENT)
Dept: PHYSICAL THERAPY | Facility: CLINIC | Age: 30
End: 2025-04-23
Payer: COMMERCIAL

## 2025-04-29 ENCOUNTER — OFFICE VISIT (OUTPATIENT)
Age: 30
End: 2025-04-29
Payer: COMMERCIAL

## 2025-04-29 VITALS
HEIGHT: 74 IN | SYSTOLIC BLOOD PRESSURE: 130 MMHG | DIASTOLIC BLOOD PRESSURE: 84 MMHG | WEIGHT: 315 LBS | HEART RATE: 94 BPM | TEMPERATURE: 98.7 F | BODY MASS INDEX: 40.43 KG/M2 | OXYGEN SATURATION: 97 %

## 2025-04-29 DIAGNOSIS — Z00.00 ROUTINE ADULT HEALTH MAINTENANCE: Primary | ICD-10-CM

## 2025-04-29 PROCEDURE — 99395 PREV VISIT EST AGE 18-39: CPT | Performed by: FAMILY MEDICINE

## 2025-04-29 RX ORDER — MELOXICAM 15 MG/1
15 TABLET ORAL DAILY
COMMUNITY
Start: 2025-04-24

## 2025-04-30 ENCOUNTER — OFFICE VISIT (OUTPATIENT)
Dept: PHYSICAL THERAPY | Facility: CLINIC | Age: 30
End: 2025-04-30
Attending: FAMILY MEDICINE
Payer: COMMERCIAL

## 2025-04-30 DIAGNOSIS — M54.12 CERVICAL RADICULOPATHY: Primary | ICD-10-CM

## 2025-04-30 PROCEDURE — 97140 MANUAL THERAPY 1/> REGIONS: CPT

## 2025-04-30 PROCEDURE — 97110 THERAPEUTIC EXERCISES: CPT

## 2025-04-30 NOTE — PROGRESS NOTES
Daily Note + Discharge Note     Today's date: 2025  Patient name: Neo Cabral Jr.  : 1995  MRN: [id number]  Referring provider: Marco Antonio Johnson MD  Dx:   Encounter Diagnosis     ICD-10-CM    1. Cervical radiculopathy  M54.12                      Subjective: Pt states that he has been feeling much better overall and has been cleared to go back to work next week. Indicates that he continues with his stretches and exercises at home without difficulty. Has not had to much numbness or tingling.       Objective: See treatment diary below    Cervical    Flexion:  WFL  Extension:  with pain Restriction level: minimal  Left lateral flexion: 39 degrees      Right lateral flexion: 40 degrees      Left rotation: 70 degrees  Right rotation: 70 degrees       Strength/Myotome Testing     Left Shoulder     Planes of Motion   Flexion: 4+   Extension: 4+   Abduction: 4+  External rotation at 0°: 4+     Left Elbow   Flexion: 4+      Assessment: Tolerated treatment well. HEP was reviewed and performed with patient today. Patient demonstrated fatigue post treatment and exhibited good technique with therapeutic exercises. Recommended to continue with current HEP and updated HEP regularly for the next ~4 weeks. Pt has demonstrated significant improvement with cervical AROM, shoulder abduction and elbow flexion strength, and reports of radicular and neck symptoms and pain. Plan is to discharge patient at this time with HEP.       Plan:    Advised patient to continue with home exercise program which I reviewed with them today. Advised patient to contact me with any future questions or concerns regarding exercise. Pt is in agreement with discharge plan.       Precautions: GERD    Daily Treatment Diary    Date 3/27 4/2 4/10 4/14 4/17 4/30       FOTO IE -      PERF       Re-Eval IE               Manuals    Cervical distraction  JM - grade 1 EH JM - grade 1 EH        UT, LS, SOR STM  JM - UT stretch And UT stretch -    EH   "Theragun 1750 to UT/LS    - EH Theragun 1750 to UT/LS    -                                  Neuro Re-Ed                  Radial nerve glide 3x10 2x10 (L) L -  2x10          Cervical retraction 2x10  Supine today 2x10     With head lift 3\" hold 10x Supine  2x10    With head lift  3\"x10 Supine  2x10    With head lift  5\"x10 Supine  2x10    With head lift  5\"x10 2x10  HEP                                                          Ther Ex    Arm bike - ROM  2 min fwd and 2 mins bkw 2 min ea  L1 2 min fwd and 2 mins bkw 2 min ea  L1 2 min ea  L1       UT stretch 30\" hold 3x   30\" hold 3x 30\"x3  bilat 30\"x3  bilat HEP      Scapular retraction  Green TB 3x10  Green  3x10          Cervical retraction with extension (towel) 2x10     2x10  HEP      Supine pec progression   (3 motions)    Abd, flex/ext, angels 1 min ea Pec stretch, hugs, angels     1 min ea 4 motions - 1 min ea       SA roll ups with TB    OTB 2x8 OTB  2x10 OTB 3x8 HEP      Standing shoulder ext and row at [place] Ext 16.0 3x10    Row 20.0 3x10  Ext  16.0  3x10    Row  20.0  3x10 Ext with black TB 15x    Ext  23.0  3x10    Row  22.0  3x10 Ext  23.0  3x10    Row  22.5  3x10 Ext  28.0  3x10    Row  23.5  3x10       Seated thoracic extensions  With blue foam 20x With blue foam roller    x20 With blue foam roller    x20 With blue foam roller    x20        Wall 'angels'  15x x15          Wall clock     OTB 6 points 7x ea OTB    6 pointsx7 ea OTB 6 points 7x ea HEP                                             Ther Activity                              Gait Training                              Modalities    Ice vs HP PRN                                 "

## 2025-04-30 NOTE — PROGRESS NOTES
"Name: Neo Cabral Jr.      : 1995      MRN: [id number]  Encounter Provider: Marco Antonio Johnson MD  Encounter Date: 2025   Encounter department: [facility] PRIMARY CARE  :  Assessment & Plan  Routine adult health maintenance  Annual well visit. Discussed various safety and health maintenance issues including healthy diet like the Mediterranean diet, exercise, ample sleep, stress reduction, and healthy weight as tolerated. Discussed supportive care and return parameters.   Orders:    CBC and differential; Future    Comprehensive metabolic panel; Future    TSH, 3rd generation with Free T4 reflex; Future    Lipid Panel with Direct LDL reflex; Future           History of Present Illness   Patient is a 29 y/o male who presents for annual well visit admits being active eats and sleeps well.      Review of Systems   Constitutional: Negative.    HENT: Negative.     Eyes: Negative.    Respiratory: Negative.     Cardiovascular: Negative.    Gastrointestinal: Negative.    Endocrine: Negative.    Genitourinary: Negative.    Musculoskeletal: Negative.    Allergic/Immunologic: Negative.    Neurological: Negative.    Hematological: Negative.    Psychiatric/Behavioral: Negative.     All other systems reviewed and are negative.      Objective   /84 (BP Location: Right arm, Patient Position: Sitting, Cuff Size: Large)   Pulse 94   Temp 98.7 °F (37.1 °C) (Temporal)   Ht 6' 2\" (1.88 m)   Wt (!) 154 kg (339 lb 12.8 oz)   SpO2 97%   BMI 43.63 kg/m²      Physical Exam  Vitals reviewed.   Constitutional:       General: He is not in acute distress.     Appearance: He is well-developed. He is not diaphoretic.   HENT:      Head: Normocephalic and atraumatic.      Right Ear: External ear normal.      Left Ear: External ear normal.      Nose: Nose normal.   Eyes:      General: No scleral icterus.        Right eye: No discharge.         Left eye: No discharge.      Conjunctiva/sclera: Conjunctivae normal.     "  Pupils: Pupils are equal, round, and reactive to light.   Neck:      Thyroid: No thyromegaly.      Trachea: No tracheal deviation.   Cardiovascular:      Rate and Rhythm: Normal rate and regular rhythm.      Heart sounds: Normal heart sounds. No murmur heard.     No friction rub.   Pulmonary:      Effort: Pulmonary effort is normal. No respiratory distress.      Breath sounds: Normal breath sounds. No stridor. No wheezing or rales.   Abdominal:      General: There is no distension.      Palpations: Abdomen is soft. There is no mass.      Tenderness: There is no abdominal tenderness. There is no guarding or rebound.   Musculoskeletal:         General: Normal range of motion.      Cervical back: Normal range of motion and neck supple.   Lymphadenopathy:      Cervical: No cervical adenopathy.   Skin:     General: Skin is warm.   Neurological:      Mental Status: He is alert and oriented to person, place, and time.      Cranial Nerves: No cranial nerve deficit.   Psychiatric:         Behavior: Behavior normal.         Thought Content: Thought content normal.         Judgment: Judgment normal.         Answers submitted by the patient for this visit:  Annual Physical (Submitted on 4/28/2025)  Diet/Nutrition choices: no special diet  Exercise choices: walking, 1-2 times a week on average  Sleep choices: 7-8 hours of sleep on average  Vision choices: wears glasses  Dental choices: brushes teeth twice daily  Do you currently have an OB/GYN provider that you routinely follow with?: No  Any history of sexual transmitted disease/infection?: No  Urinary symptoms: none  Do you have an advance directive/living will?: No  Do you have a durable power of  (POA)?: No

## 2025-04-30 NOTE — ASSESSMENT & PLAN NOTE
Annual well visit. Discussed various safety and health maintenance issues including healthy diet like the Mediterranean diet, exercise, ample sleep, stress reduction, and healthy weight as tolerated. Discussed supportive care and return parameters.   Orders:    CBC and differential; Future    Comprehensive metabolic panel; Future    TSH, 3rd generation with Free T4 reflex; Future    Lipid Panel with Direct LDL reflex; Future

## 2025-05-29 PROBLEM — Z00.00 ROUTINE ADULT HEALTH MAINTENANCE: Status: RESOLVED | Noted: 2024-04-19 | Resolved: 2025-05-29

## 2025-06-25 ENCOUNTER — OFFICE VISIT (OUTPATIENT)
Age: 30
End: 2025-06-25
Payer: COMMERCIAL

## 2025-06-25 VITALS
TEMPERATURE: 98.1 F | HEIGHT: 74 IN | SYSTOLIC BLOOD PRESSURE: 120 MMHG | RESPIRATION RATE: 16 BRPM | DIASTOLIC BLOOD PRESSURE: 70 MMHG | BODY MASS INDEX: 43.63 KG/M2 | OXYGEN SATURATION: 98 % | HEART RATE: 88 BPM

## 2025-06-25 DIAGNOSIS — M54.12 CERVICAL RADICULOPATHY: Primary | ICD-10-CM

## 2025-06-25 PROCEDURE — 99213 OFFICE O/P EST LOW 20 MIN: CPT | Performed by: FAMILY MEDICINE

## 2025-06-25 RX ORDER — MELOXICAM 15 MG/1
15 TABLET ORAL DAILY
Qty: 30 TABLET | Refills: 1 | Status: SHIPPED | OUTPATIENT
Start: 2025-06-25 | End: 2025-06-26 | Stop reason: SDUPTHER

## 2025-06-25 RX ORDER — MELOXICAM 15 MG/1
15 TABLET ORAL DAILY
Qty: 30 TABLET | Refills: 1 | Status: SHIPPED | OUTPATIENT
Start: 2025-06-25 | End: 2025-06-25 | Stop reason: SDUPTHER

## 2025-06-26 DIAGNOSIS — M54.12 CERVICAL RADICULOPATHY: ICD-10-CM

## 2025-06-26 RX ORDER — MELOXICAM 15 MG/1
15 TABLET ORAL DAILY
Qty: 30 TABLET | Refills: 1 | Status: SHIPPED | OUTPATIENT
Start: 2025-06-26

## 2025-06-27 DIAGNOSIS — J20.8 ACUTE BRONCHITIS DUE TO OTHER SPECIFIED ORGANISMS: ICD-10-CM

## 2025-06-27 NOTE — ASSESSMENT & PLAN NOTE
Discussed heat and cold, stretching and NSAIDs, pt to call back if not improving or worsening. Discussed supportive care and return parameters.

## 2025-06-27 NOTE — PROGRESS NOTES
"Name: Neo Cabral Jr.      : 1995      MRN: [id number]  Encounter Provider: Marco Antonio Johnson MD  Encounter Date: 2025   Encounter department: [facility] PRIMARY CARE  :  Assessment & Plan  Cervical radiculopathy  Discussed heat and cold, stretching and NSAIDs, pt to call back if not improving or worsening. Discussed supportive care and return parameters.               History of Present Illness   Patient is a 29 y/o male who presents c/o flare of neck pain again no fevers chills nausea or vomiting.    Neck Pain       Review of Systems   Constitutional: Negative.    HENT: Negative.     Eyes: Negative.    Respiratory: Negative.     Cardiovascular: Negative.    Gastrointestinal: Negative.    Endocrine: Negative.    Genitourinary: Negative.    Musculoskeletal:  Positive for neck pain.   Allergic/Immunologic: Negative.    Neurological: Negative.    Hematological: Negative.    Psychiatric/Behavioral: Negative.     All other systems reviewed and are negative.      Objective   /70 (BP Location: Left arm, Patient Position: Sitting, Cuff Size: Large)   Pulse 88   Temp 98.1 °F (36.7 °C) (Temporal)   Resp 16   Ht 6' 2\" (1.88 m)   SpO2 98%   BMI 43.63 kg/m²      Physical Exam  Vitals reviewed.   Constitutional:       General: He is not in acute distress.     Appearance: He is well-developed. He is not diaphoretic.   HENT:      Head: Normocephalic and atraumatic.      Right Ear: External ear normal.      Left Ear: External ear normal.      Nose: Nose normal.     Eyes:      General: No scleral icterus.        Right eye: No discharge.         Left eye: No discharge.      Conjunctiva/sclera: Conjunctivae normal.      Pupils: Pupils are equal, round, and reactive to light.     Neck:      Thyroid: No thyromegaly.      Trachea: No tracheal deviation.     Cardiovascular:      Rate and Rhythm: Normal rate and regular rhythm.      Heart sounds: Normal heart sounds. No murmur heard.     No friction " rub.   Pulmonary:      Effort: Pulmonary effort is normal. No respiratory distress.      Breath sounds: Normal breath sounds. No stridor. No wheezing or rales.   Abdominal:      General: There is no distension.      Palpations: Abdomen is soft. There is no mass.      Tenderness: There is no abdominal tenderness. There is no guarding or rebound.     Musculoskeletal:         General: Normal range of motion.      Cervical back: Normal range of motion and neck supple.   Lymphadenopathy:      Cervical: No cervical adenopathy.     Skin:     General: Skin is warm.     Neurological:      Mental Status: He is alert and oriented to person, place, and time.      Cranial Nerves: No cranial nerve deficit.     Psychiatric:         Behavior: Behavior normal.         Thought Content: Thought content normal.         Judgment: Judgment normal.

## 2025-06-30 ENCOUNTER — TELEPHONE (OUTPATIENT)
Age: 30
End: 2025-06-30

## 2025-06-30 RX ORDER — FLUTICASONE PROPIONATE AND SALMETEROL 250; 50 UG/1; UG/1
POWDER RESPIRATORY (INHALATION)
Refills: 1 | OUTPATIENT
Start: 2025-06-30

## 2025-06-30 NOTE — TELEPHONE ENCOUNTER
Reason for call:   [x] Prior Auth  [] Other:     Caller:  [] Patient  [x] Pharmacy  Name: [facility]   Address: [address] [place] [address] [address] [place]   Callback Number: [phone]    Medication: Advair Diskus     Dose/Frequency: 250-50 mcg/dose inhaler. Inhale 1 puff 2x daily. Rinse mouth after use.     Quantity: 60    Ordering Provider:   [x] PCP/Provider -   [] Speciality/Provider -

## 2025-06-30 NOTE — TELEPHONE ENCOUNTER
PA for Fluticasone-Salmeterol 250-50mcg/dose SUBMITTED to [employer]    via    []CMM-KEY:   [x][employer]-Case ID #[id number]   []Availity-Auth ID # NDC #   []Faxed to plan   []Other website   []Phone call Case ID #     [x]PA sent as URGENT    All office notes, labs and other pertaining documents and studies sent. Clinical questions answered. Awaiting determination from insurance company.     Turnaround time for your insurance to make a decision on your Prior Authorization can take 7-21 business days.

## 2025-06-30 NOTE — TELEPHONE ENCOUNTER
PA for WIXELA 250-50 INHUB DENIED    Reason:        Message sent to office clinical pool Yes    Denial letter scanned into Media Yes    We can gladly do an appeal but the process can take about 30-60 days to provide determination. Please have the office staff schedule a Peer to Peer at phone [phone] . If an appeal is truly warranted please have Provider send clinical documentation to the PA department to support the appeal.     **Please follow up with your patient regarding denial and next steps**

## 2025-07-01 NOTE — TELEPHONE ENCOUNTER
Left voice message advising of Wixela denial.  I advise patient in message he will need to call his insurance company to see if they will cover any alternatives.

## 2025-07-18 ENCOUNTER — OFFICE VISIT (OUTPATIENT)
Age: 30
End: 2025-07-18
Payer: COMMERCIAL

## 2025-07-18 VITALS
WEIGHT: 315 LBS | HEIGHT: 74 IN | OXYGEN SATURATION: 97 % | BODY MASS INDEX: 40.43 KG/M2 | DIASTOLIC BLOOD PRESSURE: 80 MMHG | SYSTOLIC BLOOD PRESSURE: 118 MMHG | HEART RATE: 75 BPM | TEMPERATURE: 98.2 F

## 2025-07-18 DIAGNOSIS — M54.12 CERVICAL RADICULOPATHY: Primary | ICD-10-CM

## 2025-07-18 DIAGNOSIS — S69.91XA INJURY OF FINGER OF RIGHT HAND, INITIAL ENCOUNTER: ICD-10-CM

## 2025-07-18 PROCEDURE — 99214 OFFICE O/P EST MOD 30 MIN: CPT | Performed by: FAMILY MEDICINE

## 2025-07-21 ENCOUNTER — TELEPHONE (OUTPATIENT)
Age: 30
End: 2025-07-21

## 2025-07-21 PROBLEM — S69.91XA INJURY OF FINGER OF RIGHT HAND: Status: ACTIVE | Noted: 2025-07-21

## 2025-07-21 NOTE — PROGRESS NOTES
"Name: Neo Cabral Jr.      : 1995      MRN: [id number]  Encounter Provider: Marco Antonio Johnson MD  Encounter Date: 2025   Encounter department: [facility] PRIMARY CARE  :  Assessment & Plan  Cervical radiculopathy  Pt with recurrent / persistent issues post PT, will check MRI and refer to pain management. Discussed supportive care and return parameters.   Orders:    MRI cervical spine wo contrast; Future    Ambulatory referral to Spine & Pain Management; Future    Injury of finger of right hand, initial encounter  Refer to hand surgeon. Discussed supportive care and return parameters.   Orders:    Ambulatory Referral to Orthopedic Surgery; Future           History of Present Illness   Patient is a 29 y/o male who presents c/o persistence of cervical radicular symptoms no fevers chills nausea or vomiting, pt also would like to see someone for his finger as he has remove h/o tendon injury.    Neck Pain       Review of Systems   Constitutional: Negative.    HENT: Negative.     Eyes: Negative.    Respiratory: Negative.     Cardiovascular: Negative.    Gastrointestinal: Negative.    Endocrine: Negative.    Genitourinary: Negative.    Musculoskeletal:  Positive for arthralgias, myalgias and neck pain.   Allergic/Immunologic: Negative.    Neurological: Negative.    Hematological: Negative.    Psychiatric/Behavioral: Negative.     All other systems reviewed and are negative.      Objective   /80 (BP Location: Right arm, Patient Position: Sitting, Cuff Size: Large)   Pulse 75   Temp 98.2 °F (36.8 °C) (Temporal)   Ht 6' 2\" (1.88 m)   Wt (!) 151 kg (331 lb 12.8 oz)   SpO2 97%   BMI 42.60 kg/m²      Physical Exam  Vitals reviewed.   Constitutional:       General: He is not in acute distress.     Appearance: He is well-developed. He is not diaphoretic.   HENT:      Head: Normocephalic and atraumatic.      Right Ear: External ear normal.      Left Ear: External ear normal.      Nose: Nose " normal.     Eyes:      General: No scleral icterus.        Right eye: No discharge.         Left eye: No discharge.      Conjunctiva/sclera: Conjunctivae normal.      Pupils: Pupils are equal, round, and reactive to light.     Neck:      Thyroid: No thyromegaly.      Trachea: No tracheal deviation.     Cardiovascular:      Rate and Rhythm: Normal rate and regular rhythm.      Heart sounds: Normal heart sounds. No murmur heard.     No friction rub.   Pulmonary:      Effort: Pulmonary effort is normal. No respiratory distress.      Breath sounds: Normal breath sounds. No stridor. No wheezing or rales.   Abdominal:      General: There is no distension.      Palpations: Abdomen is soft. There is no mass.      Tenderness: There is no abdominal tenderness. There is no guarding or rebound.     Musculoskeletal:         General: Tenderness present.      Cervical back: Normal range of motion and neck supple.   Lymphadenopathy:      Cervical: No cervical adenopathy.     Skin:     General: Skin is warm.     Neurological:      Mental Status: He is alert and oriented to person, place, and time.      Cranial Nerves: No cranial nerve deficit.     Psychiatric:         Behavior: Behavior normal.         Thought Content: Thought content normal.         Judgment: Judgment normal.

## 2025-07-21 NOTE — ASSESSMENT & PLAN NOTE
Pt with recurrent / persistent issues post PT, will check MRI and refer to pain management. Discussed supportive care and return parameters.   Orders:    MRI cervical spine wo contrast; Future    Ambulatory referral to Spine & Pain Management; Future

## 2025-07-21 NOTE — ASSESSMENT & PLAN NOTE
Refer to hand surgeon. Discussed supportive care and return parameters.   Orders:    Ambulatory Referral to Orthopedic Surgery; Future

## 2025-07-21 NOTE — LETTER
Phone [phone]  Fax [phone]      July 22, 2025          RE: Neo Cabral        To whom it may concern,      Please be advised, Neo Cabral is under my professional care. Patient is not to look up or hyperextend neck until he has surgery. If you haveany further questions or concerns, please contact our office.              Thank you,      Marco Antonio Johnson MD

## 2025-07-21 NOTE — TELEPHONE ENCOUNTER
Patient called to request letter that was sent to his employer on 7/18 be addended. He would like to to state patient is not to look up or hyperextend neck until he has surgery for this. Please fax to [phone]. Any questions patient can be reached back at [phone]. Thank you.

## 2025-08-01 ENCOUNTER — HOSPITAL ENCOUNTER (OUTPATIENT)
Facility: MEDICAL CENTER | Age: 30
Discharge: HOME | End: 2025-08-01
Attending: FAMILY MEDICINE

## 2025-08-01 DIAGNOSIS — M54.12 CERVICAL RADICULOPATHY: ICD-10-CM

## (undated) DEVICE — SINGLE-USE BIOPSY FORCEPS: Brand: RADIAL JAW 4